# Patient Record
Sex: FEMALE | Race: WHITE | NOT HISPANIC OR LATINO
[De-identification: names, ages, dates, MRNs, and addresses within clinical notes are randomized per-mention and may not be internally consistent; named-entity substitution may affect disease eponyms.]

---

## 2017-01-25 ENCOUNTER — APPOINTMENT (OUTPATIENT)
Dept: ORTHOPEDIC SURGERY | Facility: CLINIC | Age: 72
End: 2017-01-25

## 2017-01-25 VITALS
WEIGHT: 220 LBS | TEMPERATURE: 97.8 F | SYSTOLIC BLOOD PRESSURE: 166 MMHG | HEIGHT: 61 IN | DIASTOLIC BLOOD PRESSURE: 90 MMHG | HEART RATE: 93 BPM | BODY MASS INDEX: 41.54 KG/M2

## 2017-01-25 DIAGNOSIS — Z78.9 OTHER SPECIFIED HEALTH STATUS: ICD-10-CM

## 2017-01-25 DIAGNOSIS — Z87.891 PERSONAL HISTORY OF NICOTINE DEPENDENCE: ICD-10-CM

## 2017-01-25 RX ORDER — ENALAPRIL MALEATE 5 MG/1
TABLET ORAL
Refills: 0 | Status: ACTIVE | COMMUNITY

## 2017-01-25 RX ORDER — LEVOTHYROXINE SODIUM 0.17 MG/1
TABLET ORAL
Refills: 0 | Status: ACTIVE | COMMUNITY

## 2017-01-25 RX ORDER — CELECOXIB 50 MG/1
CAPSULE ORAL
Refills: 0 | Status: ACTIVE | COMMUNITY

## 2017-01-27 ENCOUNTER — TRANSCRIPTION ENCOUNTER (OUTPATIENT)
Age: 72
End: 2017-01-27

## 2017-02-13 ENCOUNTER — APPOINTMENT (OUTPATIENT)
Dept: DERMATOLOGY | Facility: CLINIC | Age: 72
End: 2017-02-13

## 2017-03-06 ENCOUNTER — OUTPATIENT (OUTPATIENT)
Dept: OUTPATIENT SERVICES | Facility: HOSPITAL | Age: 72
LOS: 1 days | End: 2017-03-06
Payer: MEDICARE

## 2017-03-06 DIAGNOSIS — M17.12 UNILATERAL PRIMARY OSTEOARTHRITIS, LEFT KNEE: ICD-10-CM

## 2017-03-06 DIAGNOSIS — Z51.89 ENCOUNTER FOR OTHER SPECIFIED AFTERCARE: ICD-10-CM

## 2017-03-31 PROCEDURE — 97110 THERAPEUTIC EXERCISES: CPT

## 2017-03-31 PROCEDURE — 97162 PT EVAL MOD COMPLEX 30 MIN: CPT

## 2017-03-31 PROCEDURE — G8978: CPT | Mod: CK

## 2017-03-31 PROCEDURE — 97010 HOT OR COLD PACKS THERAPY: CPT

## 2017-03-31 PROCEDURE — G8979: CPT | Mod: CJ

## 2017-07-27 ENCOUNTER — RESULT REVIEW (OUTPATIENT)
Age: 72
End: 2017-07-27

## 2017-07-28 ENCOUNTER — APPOINTMENT (OUTPATIENT)
Dept: DERMATOLOGY | Facility: CLINIC | Age: 72
End: 2017-07-28
Payer: MEDICARE

## 2017-07-28 PROCEDURE — 17110 DESTRUCTION B9 LES UP TO 14: CPT

## 2017-07-28 PROCEDURE — 11100 BX SKIN SUBCUTANEOUS&/MUCOUS MEMBRANE 1 LESION: CPT | Mod: 59

## 2017-07-28 PROCEDURE — 99213 OFFICE O/P EST LOW 20 MIN: CPT | Mod: 25

## 2017-07-28 PROCEDURE — 17000 DESTRUCT PREMALG LESION: CPT | Mod: 59

## 2017-09-10 ENCOUNTER — RESULT REVIEW (OUTPATIENT)
Age: 72
End: 2017-09-10

## 2017-09-11 ENCOUNTER — APPOINTMENT (OUTPATIENT)
Dept: DERMATOLOGY | Facility: CLINIC | Age: 72
End: 2017-09-11
Payer: MEDICARE

## 2017-09-11 PROCEDURE — 99213 OFFICE O/P EST LOW 20 MIN: CPT | Mod: 25

## 2017-09-11 PROCEDURE — 17110 DESTRUCTION B9 LES UP TO 14: CPT

## 2017-09-19 ENCOUNTER — APPOINTMENT (OUTPATIENT)
Dept: NEUROLOGY | Facility: CLINIC | Age: 72
End: 2017-09-19
Payer: MEDICARE

## 2017-09-19 VITALS
HEIGHT: 62 IN | BODY MASS INDEX: 32.2 KG/M2 | WEIGHT: 175 LBS | SYSTOLIC BLOOD PRESSURE: 130 MMHG | DIASTOLIC BLOOD PRESSURE: 90 MMHG

## 2017-09-19 DIAGNOSIS — E03.9 HYPOTHYROIDISM, UNSPECIFIED: ICD-10-CM

## 2017-09-19 PROCEDURE — 99215 OFFICE O/P EST HI 40 MIN: CPT

## 2017-09-19 RX ORDER — CELECOXIB 200 MG/1
200 CAPSULE ORAL
Qty: 90 | Refills: 0 | Status: ACTIVE | COMMUNITY
Start: 2017-03-31

## 2017-09-19 RX ORDER — EXTENDED PHENYTOIN SODIUM 30 MG/1
30 CAPSULE ORAL
Qty: 180 | Refills: 0 | Status: ACTIVE | COMMUNITY
Start: 2017-03-20

## 2017-09-19 RX ORDER — ENALAPRIL MALEATE AND HYDROCHLOROTHIAZIDE 10; 25 MG/1; MG/1
10-25 TABLET ORAL
Qty: 90 | Refills: 0 | Status: ACTIVE | COMMUNITY
Start: 2017-03-31

## 2017-09-19 RX ORDER — MECLIZINE HYDROCHLORIDE 25 MG/1
25 TABLET ORAL
Qty: 90 | Refills: 0 | Status: ACTIVE | COMMUNITY
Start: 2017-04-25

## 2017-09-19 RX ORDER — ATORVASTATIN CALCIUM 10 MG/1
10 TABLET, FILM COATED ORAL
Qty: 90 | Refills: 0 | Status: ACTIVE | COMMUNITY
Start: 2017-04-24

## 2017-12-26 ENCOUNTER — APPOINTMENT (OUTPATIENT)
Dept: NEUROLOGY | Facility: CLINIC | Age: 72
End: 2017-12-26
Payer: MEDICARE

## 2017-12-26 VITALS
SYSTOLIC BLOOD PRESSURE: 128 MMHG | DIASTOLIC BLOOD PRESSURE: 76 MMHG | WEIGHT: 175 LBS | BODY MASS INDEX: 32.2 KG/M2 | HEIGHT: 62 IN

## 2017-12-26 PROCEDURE — 99214 OFFICE O/P EST MOD 30 MIN: CPT

## 2018-06-26 ENCOUNTER — APPOINTMENT (OUTPATIENT)
Dept: NEUROLOGY | Facility: CLINIC | Age: 73
End: 2018-06-26
Payer: MEDICARE

## 2018-06-26 VITALS
WEIGHT: 175 LBS | DIASTOLIC BLOOD PRESSURE: 78 MMHG | BODY MASS INDEX: 32.2 KG/M2 | HEIGHT: 62 IN | SYSTOLIC BLOOD PRESSURE: 145 MMHG

## 2018-06-26 PROCEDURE — 99214 OFFICE O/P EST MOD 30 MIN: CPT

## 2018-07-12 ENCOUNTER — APPOINTMENT (OUTPATIENT)
Dept: DERMATOLOGY | Facility: CLINIC | Age: 73
End: 2018-07-12
Payer: MEDICARE

## 2018-07-12 PROCEDURE — 99214 OFFICE O/P EST MOD 30 MIN: CPT

## 2018-09-19 ENCOUNTER — APPOINTMENT (OUTPATIENT)
Dept: DERMATOLOGY | Facility: CLINIC | Age: 73
End: 2018-09-19

## 2018-09-25 ENCOUNTER — RESULT REVIEW (OUTPATIENT)
Age: 73
End: 2018-09-25

## 2018-09-26 ENCOUNTER — APPOINTMENT (OUTPATIENT)
Dept: DERMATOLOGY | Facility: CLINIC | Age: 73
End: 2018-09-26
Payer: MEDICARE

## 2018-09-26 PROCEDURE — 17262 DSTRJ MAL LES T/A/L 1.1-2.0: CPT

## 2018-09-26 PROCEDURE — 99212 OFFICE O/P EST SF 10 MIN: CPT | Mod: 25

## 2018-10-03 ENCOUNTER — APPOINTMENT (OUTPATIENT)
Dept: DERMATOLOGY | Facility: CLINIC | Age: 73
End: 2018-10-03

## 2018-12-12 ENCOUNTER — APPOINTMENT (OUTPATIENT)
Dept: NEUROLOGY | Facility: CLINIC | Age: 73
End: 2018-12-12
Payer: MEDICARE

## 2018-12-12 VITALS — HEIGHT: 62 IN | WEIGHT: 175 LBS | BODY MASS INDEX: 32.2 KG/M2

## 2018-12-12 DIAGNOSIS — I10 ESSENTIAL (PRIMARY) HYPERTENSION: ICD-10-CM

## 2018-12-12 PROCEDURE — 99214 OFFICE O/P EST MOD 30 MIN: CPT

## 2019-02-12 ENCOUNTER — RESULT REVIEW (OUTPATIENT)
Age: 74
End: 2019-02-12

## 2019-02-12 ENCOUNTER — APPOINTMENT (OUTPATIENT)
Dept: DERMATOLOGY | Facility: CLINIC | Age: 74
End: 2019-02-12
Payer: MEDICARE

## 2019-02-12 PROCEDURE — 99214 OFFICE O/P EST MOD 30 MIN: CPT | Mod: 25

## 2019-02-12 PROCEDURE — 17262 DSTRJ MAL LES T/A/L 1.1-2.0: CPT

## 2019-06-05 ENCOUNTER — RX RENEWAL (OUTPATIENT)
Age: 74
End: 2019-06-05

## 2019-06-21 ENCOUNTER — TRANSCRIPTION ENCOUNTER (OUTPATIENT)
Age: 74
End: 2019-06-21

## 2019-06-23 ENCOUNTER — RESULT REVIEW (OUTPATIENT)
Age: 74
End: 2019-06-23

## 2019-06-24 ENCOUNTER — APPOINTMENT (OUTPATIENT)
Dept: DERMATOLOGY | Facility: CLINIC | Age: 74
End: 2019-06-24
Payer: MEDICARE

## 2019-06-24 PROCEDURE — 17261 DSTRJ MAL LES T/A/L .6-1.0CM: CPT

## 2019-06-24 PROCEDURE — 11102 TANGNTL BX SKIN SINGLE LES: CPT | Mod: 59

## 2019-06-24 PROCEDURE — 99214 OFFICE O/P EST MOD 30 MIN: CPT | Mod: 25

## 2019-08-04 ENCOUNTER — RESULT REVIEW (OUTPATIENT)
Age: 74
End: 2019-08-04

## 2019-08-05 ENCOUNTER — APPOINTMENT (OUTPATIENT)
Dept: DERMATOLOGY | Facility: CLINIC | Age: 74
End: 2019-08-05
Payer: MEDICARE

## 2019-08-05 PROCEDURE — 17281 DSTR MAL LS F/E/E/N/L/M .6-1: CPT

## 2019-08-05 PROCEDURE — 99213 OFFICE O/P EST LOW 20 MIN: CPT | Mod: 25

## 2019-11-12 ENCOUNTER — MEDICATION RENEWAL (OUTPATIENT)
Age: 74
End: 2019-11-12

## 2019-12-03 ENCOUNTER — RX RENEWAL (OUTPATIENT)
Age: 74
End: 2019-12-03

## 2019-12-16 ENCOUNTER — RESULT REVIEW (OUTPATIENT)
Age: 74
End: 2019-12-16

## 2019-12-16 ENCOUNTER — APPOINTMENT (OUTPATIENT)
Dept: DERMATOLOGY | Facility: CLINIC | Age: 74
End: 2019-12-16
Payer: MEDICARE

## 2019-12-16 PROCEDURE — 99213 OFFICE O/P EST LOW 20 MIN: CPT | Mod: 25

## 2019-12-16 PROCEDURE — 11102 TANGNTL BX SKIN SINGLE LES: CPT

## 2019-12-22 ENCOUNTER — TRANSCRIPTION ENCOUNTER (OUTPATIENT)
Age: 74
End: 2019-12-22

## 2020-01-02 ENCOUNTER — APPOINTMENT (OUTPATIENT)
Dept: SURGICAL ONCOLOGY | Facility: CLINIC | Age: 75
End: 2020-01-02
Payer: MEDICARE

## 2020-01-02 VITALS
HEART RATE: 84 BPM | SYSTOLIC BLOOD PRESSURE: 155 MMHG | WEIGHT: 185 LBS | HEIGHT: 62 IN | TEMPERATURE: 97.7 F | BODY MASS INDEX: 34.04 KG/M2 | DIASTOLIC BLOOD PRESSURE: 83 MMHG | OXYGEN SATURATION: 100 %

## 2020-01-02 DIAGNOSIS — C44.42 SQUAMOUS CELL CARCINOMA OF SKIN OF SCALP AND NECK: ICD-10-CM

## 2020-01-02 DIAGNOSIS — Z80.3 FAMILY HISTORY OF MALIGNANT NEOPLASM OF BREAST: ICD-10-CM

## 2020-01-02 DIAGNOSIS — C44.90 UNSPECIFIED MALIGNANT NEOPLASM OF SKIN, UNSPECIFIED: ICD-10-CM

## 2020-01-02 PROCEDURE — 99204 OFFICE O/P NEW MOD 45 MIN: CPT

## 2020-01-02 NOTE — ASSESSMENT
[FreeTextEntry1] : IMP:\par Left superior parietal scalp shave biopsy performed on 12/16/19 -  Squamous cell carcinoma, invasive , well to moderately differentiated, present at the deep and peripheral tissue edges of the specimen, associated SCC in situ, inflamed actinic keratosis. \par \par PLAN:\par Wide local excision of left parietal scalp SCC. We discussed the risks, benefits, and alternatives of the procedure with the patient, she expressed understanding and agree to proceed.\par Patient does NOT want any sedation or general anesthesia.  Will do it in OR as it is a more controlled setting and this reconstruction may require a skin graft.

## 2020-01-02 NOTE — HISTORY OF PRESENT ILLNESS
[de-identified] : Ms. PARMJIT GO  is a 74 year  old female  presenting for an initial consultation for newly diagnosed skin cancer, referred by Dr. Alejo. \par \par Left superior parietal scalp shave biopsy performed on 12/16/19 -  Squamous cell carcinoma, invasive , well to moderately differentiated, present at the deep and peripheral tissue edges of the specimen, associated SCC in situ, inflamed actinic keratosis. \par \par Ms. GO reports a longstanding history of sun exposure without the use of sunblock. History of sunburns.  Denies any other concerning lesions or masses. Denies bleeding or pruritic moles. Denies pain or constitutional symptoms.\par \par PMH: Epilepsy (on Dilantin??), HTN, HLD, Hypothyroid, skin cancer (SCC in situ). \par \par
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
## 2020-01-02 NOTE — CONSULT LETTER
[Consult Letter:] : I had the pleasure of evaluating your patient, [unfilled]. [Dear  ___] : Dear  [unfilled], [Please see my note below.] : Please see my note below. [Consult Closing:] : Thank you very much for allowing me to participate in the care of this patient.  If you have any questions, please do not hesitate to contact me. [Sincerely,] : Sincerely, [FreeTextEntry3] : Marty Rowland MD, MPH, FACS\par Surgical Oncology\par [facility] [place]\par Associate Professor of Surgery\par Department of General Surgery\par [employer] [facility]

## 2020-01-14 ENCOUNTER — APPOINTMENT (OUTPATIENT)
Dept: DERMATOLOGY | Facility: CLINIC | Age: 75
End: 2020-01-14
Payer: MEDICARE

## 2020-01-14 PROCEDURE — 99213 OFFICE O/P EST LOW 20 MIN: CPT

## 2020-03-27 ENCOUNTER — APPOINTMENT (OUTPATIENT)
Dept: NEUROLOGY | Facility: CLINIC | Age: 75
End: 2020-03-27

## 2020-07-21 ENCOUNTER — APPOINTMENT (OUTPATIENT)
Dept: NEUROLOGY | Facility: CLINIC | Age: 75
End: 2020-07-21
Payer: MEDICARE

## 2020-07-21 PROCEDURE — 99213 OFFICE O/P EST LOW 20 MIN: CPT | Mod: 95

## 2020-07-21 NOTE — REASON FOR VISIT
[Follow-Up: _____] : a [unfilled] follow-up visit [FreeTextEntry1] : Chief complaint: Seizure disorder, Dr. Sewell

## 2020-07-21 NOTE — HISTORY OF PRESENT ILLNESS
[FreeTextEntry1] : I had seen her initially on May 3, 2017 with balance difficulties. She has a long history of epilepsy and was on Dilantin 630 mg a day at that time. She was following with a specialist at [facility] and said she needed to stay on the medicine even know her last seizure was 15 years ago. I sent her for a stat Dilantin level and it was 39. We  reduced the Dilantin 500 mg a day. Level June 28 was 21.4. She did not want to reduce the medicine any further. Level done September 15 was 22.2. \par \par She was still having balance difficulties so eventually we reduced the Dilantin to 400 mg a day. Level a few weeks later was 12.6. She has felt great on Dilantin 400 mg a day. She says her balance is fine. She is functioning much better overall. There have been no further seizures.\par \par Also on medication for blood pressure which is under good control.

## 2020-07-21 NOTE — CONSULT LETTER
[Dear  ___] : Dear  [unfilled], [Consult Letter:] : I had the pleasure of evaluating your patient, [unfilled]. [Please see my note below.] : Please see my note below. [Consult Closing:] : Thank you very much for allowing me to participate in the care of this patient.  If you have any questions, please do not hesitate to contact me. [Sincerely,] : Sincerely, [FreeTextEntry3] : Huey Piedra MD, PhD\par

## 2020-07-21 NOTE — ASSESSMENT
[FreeTextEntry1] : Seizure disorder. Doing well on Dilantin 400 mg a day which will be renewed. Followup in 6 months.

## 2020-09-21 ENCOUNTER — RESULT REVIEW (OUTPATIENT)
Age: 75
End: 2020-09-21

## 2020-09-22 ENCOUNTER — APPOINTMENT (OUTPATIENT)
Dept: DERMATOLOGY | Facility: CLINIC | Age: 75
End: 2020-09-22
Payer: MEDICARE

## 2020-09-22 PROCEDURE — 17272 DSTR MAL LES S/N/H/F/G 1.1-2: CPT

## 2020-09-22 PROCEDURE — 99214 OFFICE O/P EST MOD 30 MIN: CPT | Mod: 25

## 2020-09-22 PROCEDURE — 17000 DESTRUCT PREMALG LESION: CPT | Mod: 59

## 2020-11-23 ENCOUNTER — APPOINTMENT (OUTPATIENT)
Dept: DERMATOLOGY | Facility: CLINIC | Age: 75
End: 2020-11-23
Payer: MEDICARE

## 2020-11-23 ENCOUNTER — RESULT REVIEW (OUTPATIENT)
Age: 75
End: 2020-11-23

## 2020-11-23 PROCEDURE — 99213 OFFICE O/P EST LOW 20 MIN: CPT | Mod: 25

## 2020-11-23 PROCEDURE — 17282 DSTR MAL LS F/E/E/N/L/M1.1-2: CPT

## 2020-11-23 PROCEDURE — 69100 BIOPSY OF EXTERNAL EAR: CPT | Mod: 59

## 2020-12-10 ENCOUNTER — APPOINTMENT (OUTPATIENT)
Dept: DERMATOLOGY | Facility: CLINIC | Age: 75
End: 2020-12-10

## 2020-12-12 ENCOUNTER — TRANSCRIPTION ENCOUNTER (OUTPATIENT)
Age: 75
End: 2020-12-12

## 2020-12-19 ENCOUNTER — TRANSCRIPTION ENCOUNTER (OUTPATIENT)
Age: 75
End: 2020-12-19

## 2021-01-21 ENCOUNTER — APPOINTMENT (OUTPATIENT)
Dept: NEUROLOGY | Facility: CLINIC | Age: 76
End: 2021-01-21

## 2021-02-09 ENCOUNTER — TRANSCRIPTION ENCOUNTER (OUTPATIENT)
Age: 76
End: 2021-02-09

## 2021-03-05 ENCOUNTER — RESULT REVIEW (OUTPATIENT)
Age: 76
End: 2021-03-05

## 2021-03-05 ENCOUNTER — APPOINTMENT (OUTPATIENT)
Dept: DERMATOLOGY | Facility: CLINIC | Age: 76
End: 2021-03-05
Payer: MEDICARE

## 2021-03-05 PROCEDURE — 17261 DSTRJ MAL LES T/A/L .6-1.0CM: CPT

## 2021-03-05 PROCEDURE — 17262 DSTRJ MAL LES T/A/L 1.1-2.0: CPT

## 2021-03-05 PROCEDURE — 17000 DESTRUCT PREMALG LESION: CPT | Mod: 59

## 2021-03-05 PROCEDURE — 99213 OFFICE O/P EST LOW 20 MIN: CPT | Mod: 25

## 2021-05-07 ENCOUNTER — APPOINTMENT (OUTPATIENT)
Dept: DERMATOLOGY | Facility: CLINIC | Age: 76
End: 2021-05-07
Payer: MEDICARE

## 2021-05-07 ENCOUNTER — RESULT REVIEW (OUTPATIENT)
Age: 76
End: 2021-05-07

## 2021-05-07 PROCEDURE — 99213 OFFICE O/P EST LOW 20 MIN: CPT | Mod: 25

## 2021-05-07 PROCEDURE — 17003 DESTRUCT PREMALG LES 2-14: CPT

## 2021-05-07 PROCEDURE — 17000 DESTRUCT PREMALG LESION: CPT | Mod: 59

## 2021-05-07 PROCEDURE — 17271 DSTR MAL LES S/N/H/F/G 0.6-1: CPT

## 2021-05-25 ENCOUNTER — APPOINTMENT (OUTPATIENT)
Dept: DERMATOLOGY | Facility: CLINIC | Age: 76
End: 2021-05-25
Payer: MEDICARE

## 2021-05-25 PROCEDURE — 10060 I&D ABSCESS SIMPLE/SINGLE: CPT

## 2021-05-25 PROCEDURE — 99212 OFFICE O/P EST SF 10 MIN: CPT | Mod: 25

## 2021-07-13 ENCOUNTER — APPOINTMENT (OUTPATIENT)
Dept: DERMATOLOGY | Facility: CLINIC | Age: 76
End: 2021-07-13
Payer: MEDICARE

## 2021-07-13 ENCOUNTER — RESULT REVIEW (OUTPATIENT)
Age: 76
End: 2021-07-13

## 2021-07-13 PROCEDURE — 11102 TANGNTL BX SKIN SINGLE LES: CPT | Mod: 59

## 2021-07-13 PROCEDURE — 99212 OFFICE O/P EST SF 10 MIN: CPT | Mod: 25

## 2021-07-13 PROCEDURE — 11103 TANGNTL BX SKIN EA SEP/ADDL: CPT

## 2021-07-13 PROCEDURE — 17261 DSTRJ MAL LES T/A/L .6-1.0CM: CPT

## 2021-08-30 ENCOUNTER — APPOINTMENT (OUTPATIENT)
Dept: DERMATOLOGY | Facility: CLINIC | Age: 76
End: 2021-08-30
Payer: MEDICARE

## 2021-08-30 PROCEDURE — 17000 DESTRUCT PREMALG LESION: CPT

## 2021-08-30 PROCEDURE — 17003 DESTRUCT PREMALG LES 2-14: CPT

## 2021-08-30 PROCEDURE — 99213 OFFICE O/P EST LOW 20 MIN: CPT | Mod: 25

## 2021-09-15 ENCOUNTER — APPOINTMENT (OUTPATIENT)
Dept: NEUROLOGY | Facility: CLINIC | Age: 76
End: 2021-09-15
Payer: MEDICARE

## 2021-09-15 VITALS
DIASTOLIC BLOOD PRESSURE: 80 MMHG | HEIGHT: 62 IN | BODY MASS INDEX: 33.13 KG/M2 | SYSTOLIC BLOOD PRESSURE: 140 MMHG | WEIGHT: 180 LBS

## 2021-09-15 PROCEDURE — 99214 OFFICE O/P EST MOD 30 MIN: CPT

## 2021-10-31 ENCOUNTER — RX RENEWAL (OUTPATIENT)
Age: 76
End: 2021-10-31

## 2021-11-22 ENCOUNTER — APPOINTMENT (OUTPATIENT)
Dept: DERMATOLOGY | Facility: CLINIC | Age: 76
End: 2021-11-22
Payer: MEDICARE

## 2021-11-22 PROCEDURE — 99213 OFFICE O/P EST LOW 20 MIN: CPT

## 2022-03-22 ENCOUNTER — APPOINTMENT (OUTPATIENT)
Dept: DERMATOLOGY | Facility: CLINIC | Age: 77
End: 2022-03-22
Payer: MEDICARE

## 2022-03-22 ENCOUNTER — RESULT REVIEW (OUTPATIENT)
Age: 77
End: 2022-03-22

## 2022-03-22 PROCEDURE — 11102 TANGNTL BX SKIN SINGLE LES: CPT

## 2022-03-22 PROCEDURE — 99213 OFFICE O/P EST LOW 20 MIN: CPT | Mod: 25

## 2022-05-23 ENCOUNTER — APPOINTMENT (OUTPATIENT)
Dept: DERMATOLOGY | Facility: CLINIC | Age: 77
End: 2022-05-23
Payer: MEDICARE

## 2022-05-23 ENCOUNTER — RESULT REVIEW (OUTPATIENT)
Age: 77
End: 2022-05-23

## 2022-05-23 PROCEDURE — 17000 DESTRUCT PREMALG LESION: CPT

## 2022-05-23 PROCEDURE — 99213 OFFICE O/P EST LOW 20 MIN: CPT | Mod: 25

## 2022-06-05 ENCOUNTER — EMERGENCY (EMERGENCY)
Facility: HOSPITAL | Age: 77
LOS: 1 days | Discharge: HOME | End: 2022-06-05
Attending: EMERGENCY MEDICINE
Payer: MEDICARE

## 2022-06-05 VITALS
SYSTOLIC BLOOD PRESSURE: 132 MMHG | HEART RATE: 86 BPM | OXYGEN SATURATION: 95 % | RESPIRATION RATE: 18 BRPM | DIASTOLIC BLOOD PRESSURE: 82 MMHG | TEMPERATURE: 98 F

## 2022-06-05 VITALS
HEART RATE: 79 BPM | SYSTOLIC BLOOD PRESSURE: 118 MMHG | DIASTOLIC BLOOD PRESSURE: 71 MMHG | TEMPERATURE: 98 F | OXYGEN SATURATION: 96 % | RESPIRATION RATE: 18 BRPM

## 2022-06-05 LAB
ANION GAP SERPL CALC-SCNC: 13 MMOL/L — SIGNIFICANT CHANGE UP (ref 5–17)
BASOPHILS # BLD AUTO: 0.05 K/UL — SIGNIFICANT CHANGE UP (ref 0–0.2)
BASOPHILS NFR BLD AUTO: 0.6 % — SIGNIFICANT CHANGE UP (ref 0–2)
BUN SERPL-MCNC: 30.6 MG/DL — HIGH (ref 8–20)
CALCIUM SERPL-MCNC: 9.3 MG/DL — SIGNIFICANT CHANGE UP (ref 8.6–10.2)
CHLORIDE SERPL-SCNC: 100 MMOL/L — SIGNIFICANT CHANGE UP (ref 98–107)
CO2 SERPL-SCNC: 24 MMOL/L — SIGNIFICANT CHANGE UP (ref 22–29)
CREAT SERPL-MCNC: 1.12 MG/DL — SIGNIFICANT CHANGE UP (ref 0.5–1.3)
EGFR: 51 ML/MIN/1.73M2 — LOW
EOSINOPHIL # BLD AUTO: 0.11 K/UL — SIGNIFICANT CHANGE UP (ref 0–0.5)
EOSINOPHIL NFR BLD AUTO: 1.4 % — SIGNIFICANT CHANGE UP (ref 0–6)
GLUCOSE SERPL-MCNC: 108 MG/DL — HIGH (ref 70–99)
HCT VFR BLD CALC: 35.9 % — SIGNIFICANT CHANGE UP (ref 34.5–45)
HGB BLD-MCNC: 11.7 G/DL — SIGNIFICANT CHANGE UP (ref 11.5–15.5)
IMM GRANULOCYTES NFR BLD AUTO: 0.4 % — SIGNIFICANT CHANGE UP (ref 0–1.5)
LYMPHOCYTES # BLD AUTO: 1.22 K/UL — SIGNIFICANT CHANGE UP (ref 1–3.3)
LYMPHOCYTES # BLD AUTO: 15 % — SIGNIFICANT CHANGE UP (ref 13–44)
MCHC RBC-ENTMCNC: 29.4 PG — SIGNIFICANT CHANGE UP (ref 27–34)
MCHC RBC-ENTMCNC: 32.6 GM/DL — SIGNIFICANT CHANGE UP (ref 32–36)
MCV RBC AUTO: 90.2 FL — SIGNIFICANT CHANGE UP (ref 80–100)
MONOCYTES # BLD AUTO: 0.42 K/UL — SIGNIFICANT CHANGE UP (ref 0–0.9)
MONOCYTES NFR BLD AUTO: 5.2 % — SIGNIFICANT CHANGE UP (ref 2–14)
NEUTROPHILS # BLD AUTO: 6.29 K/UL — SIGNIFICANT CHANGE UP (ref 1.8–7.4)
NEUTROPHILS NFR BLD AUTO: 77.4 % — HIGH (ref 43–77)
PLATELET # BLD AUTO: 185 K/UL — SIGNIFICANT CHANGE UP (ref 150–400)
POTASSIUM SERPL-MCNC: 3.8 MMOL/L — SIGNIFICANT CHANGE UP (ref 3.5–5.3)
POTASSIUM SERPL-SCNC: 3.8 MMOL/L — SIGNIFICANT CHANGE UP (ref 3.5–5.3)
RBC # BLD: 3.98 M/UL — SIGNIFICANT CHANGE UP (ref 3.8–5.2)
RBC # FLD: 13.3 % — SIGNIFICANT CHANGE UP (ref 10.3–14.5)
SODIUM SERPL-SCNC: 137 MMOL/L — SIGNIFICANT CHANGE UP (ref 135–145)
TROPONIN T SERPL-MCNC: <0.01 NG/ML — SIGNIFICANT CHANGE UP (ref 0–0.06)
WBC # BLD: 8.12 K/UL — SIGNIFICANT CHANGE UP (ref 3.8–10.5)
WBC # FLD AUTO: 8.12 K/UL — SIGNIFICANT CHANGE UP (ref 3.8–10.5)

## 2022-06-05 PROCEDURE — 73590 X-RAY EXAM OF LOWER LEG: CPT | Mod: 26,LT

## 2022-06-05 PROCEDURE — 84484 ASSAY OF TROPONIN QUANT: CPT

## 2022-06-05 PROCEDURE — 93005 ELECTROCARDIOGRAM TRACING: CPT

## 2022-06-05 PROCEDURE — 80048 BASIC METABOLIC PNL TOTAL CA: CPT

## 2022-06-05 PROCEDURE — 99284 EMERGENCY DEPT VISIT MOD MDM: CPT | Mod: 25

## 2022-06-05 PROCEDURE — 73610 X-RAY EXAM OF ANKLE: CPT

## 2022-06-05 PROCEDURE — 73610 X-RAY EXAM OF ANKLE: CPT | Mod: 26,LT

## 2022-06-05 PROCEDURE — 12002 RPR S/N/AX/GEN/TRNK2.6-7.5CM: CPT

## 2022-06-05 PROCEDURE — 73590 X-RAY EXAM OF LOWER LEG: CPT

## 2022-06-05 PROCEDURE — 99285 EMERGENCY DEPT VISIT HI MDM: CPT | Mod: 25

## 2022-06-05 PROCEDURE — 93010 ELECTROCARDIOGRAM REPORT: CPT

## 2022-06-05 PROCEDURE — 90471 IMMUNIZATION ADMIN: CPT

## 2022-06-05 PROCEDURE — 36415 COLL VENOUS BLD VENIPUNCTURE: CPT

## 2022-06-05 PROCEDURE — 12002 RPR S/N/AX/GEN/TRNK2.6-7.5CM: CPT | Mod: XU

## 2022-06-05 PROCEDURE — 85025 COMPLETE CBC W/AUTO DIFF WBC: CPT

## 2022-06-05 PROCEDURE — 90715 TDAP VACCINE 7 YRS/> IM: CPT

## 2022-06-05 RX ORDER — LIDOCAINE HYDROCHLORIDE AND EPINEPHRINE 10; 10 MG/ML; UG/ML
20 INJECTION, SOLUTION INFILTRATION; PERINEURAL ONCE
Refills: 0 | Status: DISCONTINUED | OUTPATIENT
Start: 2022-06-05 | End: 2022-06-10

## 2022-06-05 RX ORDER — TETANUS TOXOID, REDUCED DIPHTHERIA TOXOID AND ACELLULAR PERTUSSIS VACCINE, ADSORBED 5; 2.5; 8; 8; 2.5 [IU]/.5ML; [IU]/.5ML; UG/.5ML; UG/.5ML; UG/.5ML
0.5 SUSPENSION INTRAMUSCULAR ONCE
Refills: 0 | Status: COMPLETED | OUTPATIENT
Start: 2022-06-05 | End: 2022-06-05

## 2022-06-05 RX ADMIN — TETANUS TOXOID, REDUCED DIPHTHERIA TOXOID AND ACELLULAR PERTUSSIS VACCINE, ADSORBED 0.5 MILLILITER(S): 5; 2.5; 8; 8; 2.5 SUSPENSION INTRAMUSCULAR at 17:58

## 2022-06-05 NOTE — ED PROVIDER NOTE - OBJECTIVE STATEMENT
75 yo female hx of htn and seizure d/o (no seizure in >20yrs); p/w left leg and ankle injury; patient was walking from one room of house to another, normally is fully independent without cane/walker; step down into den/living, lost her foot and tripped, twisted left ankle; had and assoc laceration to left leg; was able to put some weight on it afterwards, but had a near syncopal episode upon being stood up by ; subsequently rested in chair and called 911; those symptoms have fully resoleved, and now only c/o left leg pain

## 2022-06-05 NOTE — ED PROVIDER NOTE - PHYSICAL EXAMINATION
Const: Awake, alert and oriented. In no acute distress. Well appearing.  HEENT: NC/AT. Moist mucous membranes.  Eyes: No scleral icterus. EOMI.  Neck:. Soft and supple. Full ROM without pain.  Cardiac: Regular rate and rhythm. +S1/S2. No murmurs. Peripheral pulses 2+ and symmetric. No LE edema.  Resp: Speaking in full sentences. No evidence of respiratory distress. No wheezes, rales or rhonchi.  Abd: Soft, non-tender, non-distended. Normal bowel sounds in all 4 quadrants. No guarding or rebound.  Back: Spine midline and non-tender. No CVAT.  Neuro: no gross deficits, moving all extremities, normal speech  Skin: left medial tib/fib with 5cm semicircular lac with some subq fat exposure  MSK: left ankle mild swelling, no deformity

## 2022-06-05 NOTE — ED ADULT NURSE NOTE - OBJECTIVE STATEMENT
pt reports left left leg pain / laceratrion sp missed a step. also reports right ankle pain/ swelling. aao x4/ denies head trauma or loc. hx of epilepsy without incident "for a lot of years"

## 2022-06-05 NOTE — ED PROVIDER NOTE - CARE PLAN
Detail Level: Zone 1 Principal Discharge DX:	Ankle sprain  Secondary Diagnosis:	Laceration of left lower leg

## 2022-06-05 NOTE — ED PROCEDURE NOTE - CPROC ED POST PROC CARE GUIDE1
Verbal/written post procedure instructions were given to patient/caregiver./Instructed patient/caregiver regarding signs and symptoms of infection./Elevate the injured extremity as instructed./Keep the cast/splint/dressing clean and dry.
Verbal/written post procedure instructions were given to patient/caregiver./Instructed patient/caregiver to follow-up with primary care physician./Instructed patient/caregiver regarding signs and symptoms of infection./Keep the cast/splint/dressing clean and dry.

## 2022-06-05 NOTE — ED PROVIDER NOTE - NSFOLLOWUPINSTRUCTIONS_ED_ALL_ED_FT
SUTURE REMOVAL IN TWO WEEKS  RETURN SOONER IF ANY SIGNS OF INFECTION    Laceration    A laceration is a cut that goes through all of the layers of the skin and into the tissue that is right under the skin. Some lacerations heal on their own. Others need to be closed with skin adhesive strips, skin glue, stitches (sutures), or staples. Proper laceration care minimizes the risk of infection and helps the laceration to heal better.  If non-absorbable stitches or staples have been placed, they must be taken out within the time frame instructed by your healthcare provider.    SEEK IMMEDIATE MEDICAL CARE IF YOU HAVE ANY OF THE FOLLOWING SYMPTOMS: swelling around the wound, worsening pain, drainage from the wound, red streaking going away from your wound, inability to move finger or toe near the laceration, or discoloration of skin near the laceration.    Sprain    A sprain is a stretch or tear in one of the tough, fiber-like tissues (ligaments) in your body. This is caused by an injury to the area such as a twisting mechanism. Symptoms include pain, swelling, or bruising. Rest that area over the next several days and slowly resume activity when tolerated. Ice can help with swelling and pain.     SEEK IMMEDIATE MEDICAL CARE IF YOU HAVE ANY OF THE FOLLOWING SYMPTOMS: worsening pain, inability to move that body part, numbness or tingling.

## 2022-06-05 NOTE — ED ADULT NURSE NOTE - NSIMPLEMENTINTERV_GEN_ALL_ED
Implemented All Fall Risk Interventions:  [place] to call system. Call bell, personal items and telephone within reach. Instruct patient to call for assistance. Room bathroom lighting operational. Non-slip footwear when patient is off stretcher. Physically safe environment: no spills, clutter or unnecessary equipment. Stretcher in lowest position, wheels locked, appropriate side rails in place. Provide visual cue, wrist band, yellow gown, etc. Monitor gait and stability. Monitor for mental status changes and reorient to person, place, and time. Review medications for side effects contributing to fall risk. Reinforce activity limits and safety measures with patient and family.

## 2022-06-05 NOTE — ED PROCEDURE NOTE - PROCEDURE ADDITIONAL DETAILS
small area of corner of semicircular wound with devitalized tissue, unable to be fully reapproximated; will allow to heal by secondary intention

## 2022-06-05 NOTE — ED PROVIDER NOTE - PATIENT PORTAL LINK FT
You can access the FollowMyHealth Patient Portal offered by [facility] by registering at the following website: [email or web address]. By joining [facility]’s FollowMyHealth portal, you will also be able to view your health information using other applications (apps) compatible with our system.

## 2022-06-05 NOTE — ED ADULT TRIAGE NOTE - PRO INTERPRETER NEED 2
Over the last 2 weeks, how often have you been bothered by the following problems?          PHQ2 Score: 0  PHQ2 Score Interpretation: No further screening needed  1. Little interest or pleasure in activity?: 0  2. Feeling down, depressed, or hopeless?: 0           CC:  Eleonora Wadsworth is here today for Derm Problem (itchy and painful dimple on spine)   Patient states it is really itchy has tried hydrocortisone, and ice helps but its not practical. Has been about a week. It is near her tailbone.    Medications: medications verified and updated  Refills needed today?  NO  denies Latex allergy or sensitivity  Patient would like communication of their results via:      Cell Phone:   Telephone Information:   Mobile [phone]     Okay to leave a message containing results? Yes  Tobacco history: verified  Advanced directives: Yes on file  Patient's current [facility] status: Active.  There are no preventive care reminders to display for this patient.    Patient is up to date, no discussion needed..   English

## 2022-06-05 NOTE — ED PROVIDER NOTE - CLINICAL SUMMARY MEDICAL DECISION MAKING FREE TEXT BOX
no apparent fracture; patient splinted, +able to weight bear and ambulate; stirrup velcro splint applied for support; laceration repaired; advised on wound care and return for suture removal; all labs and ECG reviewed, no emergent findings; ok for d/c,  at bedside for escort home

## 2022-06-05 NOTE — ED ADULT TRIAGE NOTE - CHIEF COMPLAINT QUOTE
C/o ankle pain s/p fall. Pt states, "I missed the step and fell but when I got up I felt lightheaded". Denies LOC or use of anticoagulant. Abrasion to left ankle. Hx of epilepsy.

## 2022-06-20 ENCOUNTER — NON-APPOINTMENT (OUTPATIENT)
Age: 77
End: 2022-06-20

## 2022-06-30 ENCOUNTER — NON-APPOINTMENT (OUTPATIENT)
Age: 77
End: 2022-06-30

## 2022-07-27 ENCOUNTER — NON-APPOINTMENT (OUTPATIENT)
Age: 77
End: 2022-07-27

## 2022-08-29 ENCOUNTER — RESULT REVIEW (OUTPATIENT)
Age: 77
End: 2022-08-29

## 2022-08-29 ENCOUNTER — APPOINTMENT (OUTPATIENT)
Dept: DERMATOLOGY | Facility: CLINIC | Age: 77
End: 2022-08-29

## 2022-08-29 PROBLEM — I10 ESSENTIAL (PRIMARY) HYPERTENSION: Chronic | Status: ACTIVE | Noted: 2022-06-05

## 2022-08-29 PROBLEM — R56.9 UNSPECIFIED CONVULSIONS: Chronic | Status: ACTIVE | Noted: 2022-06-05

## 2022-08-29 PROCEDURE — 17281 DSTR MAL LS F/E/E/N/L/M .6-1: CPT

## 2022-08-29 PROCEDURE — 17262 DSTRJ MAL LES T/A/L 1.1-2.0: CPT | Mod: 59

## 2022-08-29 PROCEDURE — 99213 OFFICE O/P EST LOW 20 MIN: CPT | Mod: 25

## 2022-10-26 ENCOUNTER — RX RENEWAL (OUTPATIENT)
Age: 77
End: 2022-10-26

## 2022-11-07 ENCOUNTER — RESULT REVIEW (OUTPATIENT)
Age: 77
End: 2022-11-07

## 2022-11-08 ENCOUNTER — APPOINTMENT (OUTPATIENT)
Dept: DERMATOLOGY | Facility: CLINIC | Age: 77
End: 2022-11-08

## 2022-11-08 PROCEDURE — 99213 OFFICE O/P EST LOW 20 MIN: CPT | Mod: 25

## 2022-11-08 PROCEDURE — 17000 DESTRUCT PREMALG LESION: CPT | Mod: 59

## 2022-11-08 PROCEDURE — 11102 TANGNTL BX SKIN SINGLE LES: CPT | Mod: 59

## 2022-11-08 PROCEDURE — 17272 DSTR MAL LES S/N/H/F/G 1.1-2: CPT

## 2022-11-09 ENCOUNTER — APPOINTMENT (OUTPATIENT)
Dept: NEUROLOGY | Facility: CLINIC | Age: 77
End: 2022-11-09

## 2022-11-09 ENCOUNTER — NON-APPOINTMENT (OUTPATIENT)
Age: 77
End: 2022-11-09

## 2022-11-09 VITALS
DIASTOLIC BLOOD PRESSURE: 90 MMHG | HEIGHT: 61 IN | SYSTOLIC BLOOD PRESSURE: 160 MMHG | WEIGHT: 195 LBS | BODY MASS INDEX: 36.82 KG/M2

## 2022-11-09 PROCEDURE — 99214 OFFICE O/P EST MOD 30 MIN: CPT

## 2022-11-09 NOTE — HISTORY OF PRESENT ILLNESS
[FreeTextEntry1] : I had seen her initially on May 3, 2017 with balance difficulties. She has a long history of epilepsy and was on Dilantin 630 mg a day at that time. She was following with a specialist at [facility] and said she needed to stay on the medicine even know her last seizure was 15 years ago. I sent her for a stat Dilantin level and it was 39. We  reduced the Dilantin 500 mg a day. Level June 28 was 21.4. She did not want to reduce the medicine any further. Level done September 15 was 22.2. \par \par She was still having balance difficulties so eventually we reduced the Dilantin to 400 mg a day. Level a few weeks later was 12.6. She has felt great on Dilantin 400 mg a day. She says her balance is fine. She is functioning much better overall. There have been no further seizures.\par \par Also on medication for blood pressure which is under good control.\par \par Returns today, 11/9/2022.\par She feels great.  There have been no seizures.\par Says her most recent Dilantin level was 8.4

## 2022-11-09 NOTE — PHYSICAL EXAM
[FreeTextEntry1] : \par Mental status: Alert, fully oriented, speech comprehension intact, recent memory intact\par \par Cranial nerves: No ptosis  Extraocular movements full. Facial strength and sensation are normal. Tongue midline.\par \par Motor: Strength grossly normal throughout. There is no drift. No abnormal movements observed.\par \par Sensation: Intact to touch throughout\par \par Coordination: Finger-nose intact\par \par Reflexes symmetrical\par \par Gait  normal.\par \par Romberg absent.\par

## 2022-11-09 NOTE — ASSESSMENT
[FreeTextEntry1] : Seizure disorder. Doing well on Dilantin 400 mg a day which will be renewed. Followup in 1 year.

## 2022-12-09 NOTE — ED PROVIDER NOTE - INTERNATIONAL TRAVEL
Form filled out and faxed back to [facility] services department.   No

## 2022-12-13 ENCOUNTER — APPOINTMENT (OUTPATIENT)
Dept: DERMATOLOGY | Facility: CLINIC | Age: 77
End: 2022-12-13

## 2023-03-08 ENCOUNTER — RESULT REVIEW (OUTPATIENT)
Age: 78
End: 2023-03-08

## 2023-03-09 ENCOUNTER — APPOINTMENT (OUTPATIENT)
Dept: DERMATOLOGY | Facility: CLINIC | Age: 78
End: 2023-03-09
Payer: MEDICARE

## 2023-03-09 PROCEDURE — 11102 TANGNTL BX SKIN SINGLE LES: CPT

## 2023-03-09 PROCEDURE — 99213 OFFICE O/P EST LOW 20 MIN: CPT | Mod: 25

## 2023-03-09 PROCEDURE — 11103 TANGNTL BX SKIN EA SEP/ADDL: CPT | Mod: 59

## 2023-03-30 ENCOUNTER — APPOINTMENT (OUTPATIENT)
Dept: DERMATOLOGY | Facility: CLINIC | Age: 78
End: 2023-03-30
Payer: MEDICARE

## 2023-03-30 DIAGNOSIS — C44.722 SQUAMOUS CELL CARCINOMA OF SKIN OF RIGHT LOWER LIMB, INCLUDING HIP: ICD-10-CM

## 2023-03-30 DIAGNOSIS — L57.0 ACTINIC KERATOSIS: ICD-10-CM

## 2023-03-30 PROCEDURE — 99214 OFFICE O/P EST MOD 30 MIN: CPT

## 2023-03-31 PROBLEM — L57.0 ACTINIC KERATOSES: Status: ACTIVE | Noted: 2023-03-31

## 2023-03-31 PROBLEM — C44.722 SQUAMOUS CELL CARCINOMA OF SKIN OF RIGHT LOWER EXTREMITY: Status: ACTIVE | Noted: 2023-03-30

## 2023-05-09 ENCOUNTER — APPOINTMENT (OUTPATIENT)
Dept: DERMATOLOGY | Facility: CLINIC | Age: 78
End: 2023-05-09
Payer: MEDICARE

## 2023-05-09 PROCEDURE — 99213 OFFICE O/P EST LOW 20 MIN: CPT | Mod: 25

## 2023-05-09 PROCEDURE — 17262 DSTRJ MAL LES T/A/L 1.1-2.0: CPT

## 2023-06-11 ENCOUNTER — RESULT REVIEW (OUTPATIENT)
Age: 78
End: 2023-06-11

## 2023-06-12 ENCOUNTER — APPOINTMENT (OUTPATIENT)
Dept: DERMATOLOGY | Facility: CLINIC | Age: 78
End: 2023-06-12
Payer: MEDICARE

## 2023-06-12 PROCEDURE — 99213 OFFICE O/P EST LOW 20 MIN: CPT | Mod: 25

## 2023-06-12 PROCEDURE — 17262 DSTRJ MAL LES T/A/L 1.1-2.0: CPT | Mod: 59

## 2023-06-12 PROCEDURE — 17263 DSTRJ MAL LES T/A/L 2.1-3.0: CPT

## 2023-09-18 ENCOUNTER — RESULT REVIEW (OUTPATIENT)
Age: 78
End: 2023-09-18

## 2023-09-18 ENCOUNTER — APPOINTMENT (OUTPATIENT)
Dept: DERMATOLOGY | Facility: CLINIC | Age: 78
End: 2023-09-18
Payer: MEDICARE

## 2023-09-18 PROCEDURE — 99214 OFFICE O/P EST MOD 30 MIN: CPT | Mod: 25

## 2023-09-18 PROCEDURE — 11102 TANGNTL BX SKIN SINGLE LES: CPT | Mod: 59

## 2023-09-18 PROCEDURE — 17281 DSTR MAL LS F/E/E/N/L/M .6-1: CPT

## 2023-10-23 ENCOUNTER — RX RENEWAL (OUTPATIENT)
Age: 78
End: 2023-10-23

## 2023-11-09 ENCOUNTER — APPOINTMENT (OUTPATIENT)
Dept: NEUROLOGY | Facility: CLINIC | Age: 78
End: 2023-11-09

## 2023-11-27 ENCOUNTER — APPOINTMENT (OUTPATIENT)
Dept: DERMATOLOGY | Facility: CLINIC | Age: 78
End: 2023-11-27

## 2024-01-03 ENCOUNTER — APPOINTMENT (OUTPATIENT)
Dept: DERMATOLOGY | Facility: CLINIC | Age: 79
End: 2024-01-03
Payer: MEDICARE

## 2024-01-03 ENCOUNTER — RESULT REVIEW (OUTPATIENT)
Age: 79
End: 2024-01-03

## 2024-01-03 PROCEDURE — 11604 EXC TR-EXT MAL+MARG 3.1-4 CM: CPT

## 2024-01-03 PROCEDURE — 99213 OFFICE O/P EST LOW 20 MIN: CPT | Mod: 25

## 2024-01-04 ENCOUNTER — APPOINTMENT (OUTPATIENT)
Dept: DERMATOLOGY | Facility: CLINIC | Age: 79
End: 2024-01-04
Payer: OTHER GOVERNMENT

## 2024-01-04 PROCEDURE — ZZZZZ: CPT

## 2024-01-18 ENCOUNTER — APPOINTMENT (OUTPATIENT)
Dept: DERMATOLOGY | Facility: CLINIC | Age: 79
End: 2024-01-18
Payer: MEDICARE

## 2024-01-18 PROCEDURE — 99212 OFFICE O/P EST SF 10 MIN: CPT

## 2024-01-22 ENCOUNTER — RX RENEWAL (OUTPATIENT)
Age: 79
End: 2024-01-22

## 2024-03-18 ENCOUNTER — APPOINTMENT (OUTPATIENT)
Dept: NEUROLOGY | Facility: CLINIC | Age: 79
End: 2024-03-18
Payer: MEDICARE

## 2024-03-18 VITALS
WEIGHT: 200 LBS | HEIGHT: 61 IN | BODY MASS INDEX: 37.76 KG/M2 | SYSTOLIC BLOOD PRESSURE: 160 MMHG | DIASTOLIC BLOOD PRESSURE: 90 MMHG

## 2024-03-18 DIAGNOSIS — R56.9 UNSPECIFIED CONVULSIONS: ICD-10-CM

## 2024-03-18 DIAGNOSIS — G40.909 EPILEPSY, UNSPECIFIED, NOT INTRACTABLE, W/OUT STATUS EPILEPTICUS: ICD-10-CM

## 2024-03-18 PROCEDURE — 99214 OFFICE O/P EST MOD 30 MIN: CPT

## 2024-03-18 PROCEDURE — G2211 COMPLEX E/M VISIT ADD ON: CPT

## 2024-03-18 NOTE — HISTORY OF PRESENT ILLNESS
[FreeTextEntry1] : I had seen her initially on May 3, 2017 with balance difficulties. She has a long history of epilepsy and was on Dilantin 630 mg a day at that time. She was following with a specialist at [facility] and said she needed to stay on the medicine even know her last seizure was 15 years ago. I sent her for a stat Dilantin level and it was 39. We  reduced the Dilantin 500 mg a day. Level June 28 was 21.4. She did not want to reduce the medicine any further. Level done September 15 was 22.2.   She was still having balance difficulties so eventually we reduced the Dilantin to 400 mg a day. Level a few weeks later was 12.6. She has felt great on Dilantin 400 mg a day. She says her balance is fine. She is functioning much better overall. There have been no further seizures.  Also on medication for blood pressure which is under good control.  Returns today, 3/18/2024. She feels great.  There have been no seizures. Gets her Dilantin level checked through her primary care doctor and she says it has been fine.

## 2024-04-01 ENCOUNTER — RESULT REVIEW (OUTPATIENT)
Age: 79
End: 2024-04-01

## 2024-04-01 ENCOUNTER — APPOINTMENT (OUTPATIENT)
Dept: DERMATOLOGY | Facility: CLINIC | Age: 79
End: 2024-04-01
Payer: MEDICARE

## 2024-04-01 PROCEDURE — 99213 OFFICE O/P EST LOW 20 MIN: CPT | Mod: 25

## 2024-04-01 PROCEDURE — 17262 DSTRJ MAL LES T/A/L 1.1-2.0: CPT

## 2024-06-02 ENCOUNTER — RESULT REVIEW (OUTPATIENT)
Age: 79
End: 2024-06-02

## 2024-06-03 ENCOUNTER — APPOINTMENT (OUTPATIENT)
Dept: DERMATOLOGY | Facility: CLINIC | Age: 79
End: 2024-06-03
Payer: MEDICARE

## 2024-06-03 PROCEDURE — 99213 OFFICE O/P EST LOW 20 MIN: CPT | Mod: 25

## 2024-06-03 PROCEDURE — 17282 DSTR MAL LS F/E/E/N/L/M1.1-2: CPT

## 2024-06-03 PROCEDURE — 17263 DSTRJ MAL LES T/A/L 2.1-3.0: CPT

## 2024-06-04 RX ORDER — PHENYTOIN SODIUM 100 MG/1
100 CAPSULE ORAL
Qty: 360 | Refills: 3 | Status: ACTIVE | COMMUNITY
Start: 2017-06-19 | End: 1900-01-01

## 2024-06-05 ENCOUNTER — APPOINTMENT (OUTPATIENT)
Dept: ORTHOPEDIC SURGERY | Facility: CLINIC | Age: 79
End: 2024-06-05
Payer: MEDICARE

## 2024-06-05 VITALS
DIASTOLIC BLOOD PRESSURE: 80 MMHG | SYSTOLIC BLOOD PRESSURE: 152 MMHG | HEART RATE: 88 BPM | HEIGHT: 61 IN | WEIGHT: 200 LBS | BODY MASS INDEX: 37.76 KG/M2

## 2024-06-05 DIAGNOSIS — M21.00 VALGUS DEFORMITY, NOT ELSEWHERE CLASSIFIED, UNSPECIFIED SITE: ICD-10-CM

## 2024-06-05 DIAGNOSIS — M17.12 UNILATERAL PRIMARY OSTEOARTHRITIS, LEFT KNEE: ICD-10-CM

## 2024-06-05 PROCEDURE — 73562 X-RAY EXAM OF KNEE 3: CPT | Mod: LT

## 2024-06-05 PROCEDURE — 20610 DRAIN/INJ JOINT/BURSA W/O US: CPT | Mod: LT

## 2024-06-05 PROCEDURE — 99204 OFFICE O/P NEW MOD 45 MIN: CPT | Mod: 25

## 2024-06-05 NOTE — PHYSICAL EXAM
[Antalgic] : antalgic [LE] : Sensory: Intact in bilateral lower extremities [ALL] : dorsalis pedis, posterior tibial, femoral, popliteal, and radial 2+ and symmetric bilaterally [Normal] : Alert and in no acute distress [Poor Appearance] : well-appearing [de-identified] : GENERAL APPEARANCE: Well nourished and hydrated, pleasant, alert, and oriented x 3. Appears their stated age.  HEENT: Normocephalic, extraocular eye motion intact. Nasal septum midline. Oral cavity clear. External auditory canal clear.  RESPIRATORY: Breath sounds clear and audible in all lobes. No wheezing, No accessory muscle use. CARDIOVASCULAR: No apparent abnormalities. No lower leg edema. No varicosities. Pedal pulses are palpable. NEUROLOGIC: Sensation is normal, no muscle weakness in the upper or lower extremities. DERMATOLOGIC: No apparent skin lesions, moist, warm, no rash. SPINE: Cervical spine appears normal and moves freely; thoracic spine appears normal and moves freely; lumbosacral spine appears normal and moves freely, normal, nontender. MUSCULOSKELETAL: Hands, wrists, and elbows are normal and move freely, shoulders are normal and move freely.  Musculoskeletal 5/5 motor strength in bilateral lower extremities. Sensory: Intact in bilateral lower extremities. DTRs: Biceps, brachioradialis, triceps, patellar, ankle and plantar 2+ and symmetric bilaterally. Pulses: dorsalis pedis, posterior tibial, femoral, popliteal, and radial 2+ and symmetric bilaterally.  Constitutional: Alert and in no acute distress, but well-appearing.   [de-identified] : Left knee examination shows marked valgus alignment she has range of motion of 5 to 110 degree  [de-identified] : 4V xray of the left knee done in the office today and reviewed by Dr. 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

## 2024-06-05 NOTE — DISCUSSION/SUMMARY
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 The pt is a potential candidate for the left knee. The surgery was discussed in detail. Patient has a medical history of seizure disorder on Dilantin she does have skin condition sees dermatologist every 2-month. This does raise her risk of infection. She will need dermatologist clearance. She is losing ability to bear her body weight with significant valgus deformity which can be corrected with knee replacement. At this time, I suggest exhausting conservative treatment such as cortisone injections, HA injections, PT, anti-inflammatories, and low impact exercise. The pt opted for a left knee coritsone injection which she tolerated well. I gave the pt a referral to Dr. [facility] for osteoporosis evaluation. The pt will f/u in 3 months for re-evaluation.   The patient is a 78 year year old individual with end stage arthritis of their left knee joint. The patient has exhausted a minimum of 3 months conservative treatment including prior injections (cortisone and/or hyaluronic acid injections), physical therapy, over the counter NSAIDS and pain medication where indicated.  In addition, the patient's quality of life is diminished due to significant chronic pain. The patient is having difficulty with activities of daily living, including ambulating, descending stairs, and rising from a seated position. Based upon the patients continued symptoms and failure to respond to conservative treatment, I have recommended a left total knee replacement for this patient. A long discussion took place with the patient describing what a total joint replacement is and what the procedure would entail. A knee model, similar to the implant that will be used during the operation, was utilized to demonstrate and to discuss the various bearing surfaces of the implants. Implant fixation, use of cement, was also discussed with the patient. Choices of implant manufacturers, mainly [employer] and [employer], were discussed and reviewed with preference to be made by patient and surgeon prior to operation. Final selection to be based on customary practice as well as preoperative templating with selection confirmed intraoperatively based on the patient's anatomy. The patient participated and agreed with the decision-making process. The hospitalization and post-operative care and rehabilitation were also discussed. The use of perioperative antibiotics and DVT prophylaxis were discussed. The risk, benefits and alternatives to a surgical intervention were discussed at length with the patient. The patient was also advised of risks related to the medical comorbidities and elevated body mass index (BMI). A lengthy discussion took place to review the most common complications including but not limited to: deep vein thrombosis, pulmonary embolism, heart attack, stroke, infection, wound breakdown, numbness, damage to nerves, tendon, muscles, arteries or other blood vessels, death and other possible complications from anesthesia. The patient was told that we will take steps to minimize these risks by using sterile technique, antibiotics and DVT prophylaxis when appropriate and follow the patient postoperatively in the office setting to monitor progress. The possibility of recurrent pain, no improvement in pain and actual worsening of pain were also discussed with the patient.  The discharge plan of care focused on the patient going home following surgery. The patient was encouraged to make the necessary arrangements to have someone stay with them when they are discharged home. Following discharge, a home care nurse will visit the patient. The home care nurse will open your home care case and request home physical therapy services. Home physical therapy will commence following discharge provided it is appropriate and covered by the health insurance benefit plan.  The benefits of surgery were discussed with the patient including the potential for improving his/her current clinical condition through operative intervention. Alternatives to surgical intervention including continued conservative management were also discussed in detail. All questions were answered to the satisfaction of the patient. The treatment plan of care, as well as a model of a total knee equivalent to the one that will be used for their total joint replacement, was shared with the patient. The patient participated and agreed to the plan of care as well as the use of the recommended implants for their total joint replacement surgery.

## 2024-06-05 NOTE — END OF VISIT
[FreeTextEntry3] : I, Peterson Tom, acted solely as a scribe for Dr. Sal Vernon on 06/05/2024.  I personally performed the services described in the documentation, reviewed the documentation recorded by the scribe in my presence, and it accurately and completely records my words and actions.

## 2024-06-05 NOTE — REVIEW OF SYSTEMS
[Joint Pain] : joint pain [Joint Stiffness] : joint stiffness [Negative] : Heme/Lymph [FreeTextEntry9] : Left knee

## 2024-07-08 ENCOUNTER — RESULT REVIEW (OUTPATIENT)
Age: 79
End: 2024-07-08

## 2024-07-08 ENCOUNTER — APPOINTMENT (OUTPATIENT)
Dept: DERMATOLOGY | Facility: CLINIC | Age: 79
End: 2024-07-08
Payer: MEDICARE

## 2024-07-08 PROCEDURE — 17271 DSTR MAL LES S/N/H/F/G 0.6-1: CPT

## 2024-07-08 PROCEDURE — 11102 TANGNTL BX SKIN SINGLE LES: CPT | Mod: 59

## 2024-07-08 PROCEDURE — 11103 TANGNTL BX SKIN EA SEP/ADDL: CPT | Mod: 59

## 2024-07-08 PROCEDURE — 99213 OFFICE O/P EST LOW 20 MIN: CPT | Mod: 25

## 2024-07-08 PROCEDURE — 17262 DSTRJ MAL LES T/A/L 1.1-2.0: CPT

## 2024-07-17 ENCOUNTER — INPATIENT (INPATIENT)
Facility: HOSPITAL | Age: 79
LOS: 0 days | Discharge: LEFT BEFORE DISCHARGE | DRG: 916 | End: 2024-07-17
Attending: HOSPITALIST | Admitting: INTERNAL MEDICINE
Payer: MEDICARE

## 2024-07-17 VITALS
OXYGEN SATURATION: 100 % | HEART RATE: 83 BPM | TEMPERATURE: 98 F | SYSTOLIC BLOOD PRESSURE: 150 MMHG | DIASTOLIC BLOOD PRESSURE: 77 MMHG | RESPIRATION RATE: 18 BRPM

## 2024-07-17 VITALS
HEART RATE: 99 BPM | WEIGHT: 200.62 LBS | DIASTOLIC BLOOD PRESSURE: 109 MMHG | SYSTOLIC BLOOD PRESSURE: 176 MMHG | RESPIRATION RATE: 20 BRPM | OXYGEN SATURATION: 98 % | TEMPERATURE: 98 F

## 2024-07-17 DIAGNOSIS — T78.3XXA ANGIONEUROTIC EDEMA, INITIAL ENCOUNTER: ICD-10-CM

## 2024-07-17 LAB
ALBUMIN SERPL ELPH-MCNC: 3.8 G/DL — SIGNIFICANT CHANGE UP (ref 3.3–5.2)
ALP SERPL-CCNC: 137 U/L — HIGH (ref 40–120)
ALT FLD-CCNC: 13 U/L — SIGNIFICANT CHANGE UP
ANION GAP SERPL CALC-SCNC: 14 MMOL/L — SIGNIFICANT CHANGE UP (ref 5–17)
APTT BLD: 27.2 SEC — SIGNIFICANT CHANGE UP (ref 24.5–35.6)
AST SERPL-CCNC: 17 U/L — SIGNIFICANT CHANGE UP
BASOPHILS # BLD AUTO: 0.04 K/UL — SIGNIFICANT CHANGE UP (ref 0–0.2)
BASOPHILS NFR BLD AUTO: 0.6 % — SIGNIFICANT CHANGE UP (ref 0–2)
BILIRUB SERPL-MCNC: <0.2 MG/DL — LOW (ref 0.4–2)
BLD GP AB SCN SERPL QL: SIGNIFICANT CHANGE UP
BUN SERPL-MCNC: 39.8 MG/DL — HIGH (ref 8–20)
CALCIUM SERPL-MCNC: 9.2 MG/DL — SIGNIFICANT CHANGE UP (ref 8.4–10.5)
CHLORIDE SERPL-SCNC: 99 MMOL/L — SIGNIFICANT CHANGE UP (ref 96–108)
CO2 SERPL-SCNC: 25 MMOL/L — SIGNIFICANT CHANGE UP (ref 22–29)
CREAT SERPL-MCNC: 1.36 MG/DL — HIGH (ref 0.5–1.3)
EGFR: 40 ML/MIN/1.73M2 — LOW
EOSINOPHIL # BLD AUTO: 0.14 K/UL — SIGNIFICANT CHANGE UP (ref 0–0.5)
EOSINOPHIL NFR BLD AUTO: 2.1 % — SIGNIFICANT CHANGE UP (ref 0–6)
GLUCOSE SERPL-MCNC: 98 MG/DL — SIGNIFICANT CHANGE UP (ref 70–99)
HCT VFR BLD CALC: 29.7 % — LOW (ref 34.5–45)
HGB BLD-MCNC: 9.8 G/DL — LOW (ref 11.5–15.5)
IMM GRANULOCYTES NFR BLD AUTO: 0.3 % — SIGNIFICANT CHANGE UP (ref 0–0.9)
INR BLD: 0.93 RATIO — SIGNIFICANT CHANGE UP (ref 0.85–1.18)
LYMPHOCYTES # BLD AUTO: 1.69 K/UL — SIGNIFICANT CHANGE UP (ref 1–3.3)
LYMPHOCYTES # BLD AUTO: 25.3 % — SIGNIFICANT CHANGE UP (ref 13–44)
MCHC RBC-ENTMCNC: 29.3 PG — SIGNIFICANT CHANGE UP (ref 27–34)
MCHC RBC-ENTMCNC: 33 GM/DL — SIGNIFICANT CHANGE UP (ref 32–36)
MCV RBC AUTO: 88.9 FL — SIGNIFICANT CHANGE UP (ref 80–100)
MONOCYTES # BLD AUTO: 0.52 K/UL — SIGNIFICANT CHANGE UP (ref 0–0.9)
MONOCYTES NFR BLD AUTO: 7.8 % — SIGNIFICANT CHANGE UP (ref 2–14)
NEUTROPHILS # BLD AUTO: 4.27 K/UL — SIGNIFICANT CHANGE UP (ref 1.8–7.4)
NEUTROPHILS NFR BLD AUTO: 63.9 % — SIGNIFICANT CHANGE UP (ref 43–77)
PLATELET # BLD AUTO: 174 K/UL — SIGNIFICANT CHANGE UP (ref 150–400)
POTASSIUM SERPL-MCNC: 3.7 MMOL/L — SIGNIFICANT CHANGE UP (ref 3.5–5.3)
POTASSIUM SERPL-SCNC: 3.7 MMOL/L — SIGNIFICANT CHANGE UP (ref 3.5–5.3)
PROT SERPL-MCNC: 6.9 G/DL — SIGNIFICANT CHANGE UP (ref 6.6–8.7)
PROTHROM AB SERPL-ACNC: 10.3 SEC — SIGNIFICANT CHANGE UP (ref 9.5–13)
RBC # BLD: 3.34 M/UL — LOW (ref 3.8–5.2)
RBC # FLD: 14.6 % — HIGH (ref 10.3–14.5)
SODIUM SERPL-SCNC: 138 MMOL/L — SIGNIFICANT CHANGE UP (ref 135–145)
WBC # BLD: 6.68 K/UL — SIGNIFICANT CHANGE UP (ref 3.8–10.5)
WBC # FLD AUTO: 6.68 K/UL — SIGNIFICANT CHANGE UP (ref 3.8–10.5)

## 2024-07-17 PROCEDURE — 86850 RBC ANTIBODY SCREEN: CPT

## 2024-07-17 PROCEDURE — 85730 THROMBOPLASTIN TIME PARTIAL: CPT

## 2024-07-17 PROCEDURE — 96375 TX/PRO/DX INJ NEW DRUG ADDON: CPT

## 2024-07-17 PROCEDURE — 86900 BLOOD TYPING SEROLOGIC ABO: CPT

## 2024-07-17 PROCEDURE — 99233 SBSQ HOSP IP/OBS HIGH 50: CPT

## 2024-07-17 PROCEDURE — 36415 COLL VENOUS BLD VENIPUNCTURE: CPT

## 2024-07-17 PROCEDURE — 99285 EMERGENCY DEPT VISIT HI MDM: CPT

## 2024-07-17 PROCEDURE — 31575 DIAGNOSTIC LARYNGOSCOPY: CPT

## 2024-07-17 PROCEDURE — 86160 COMPLEMENT ANTIGEN: CPT

## 2024-07-17 PROCEDURE — 85025 COMPLETE CBC W/AUTO DIFF WBC: CPT

## 2024-07-17 PROCEDURE — 70490 CT SOFT TISSUE NECK W/O DYE: CPT | Mod: MC

## 2024-07-17 PROCEDURE — 80053 COMPREHEN METABOLIC PANEL: CPT

## 2024-07-17 PROCEDURE — 96372 THER/PROPH/DIAG INJ SC/IM: CPT | Mod: XU

## 2024-07-17 PROCEDURE — 85610 PROTHROMBIN TIME: CPT

## 2024-07-17 PROCEDURE — 99222 1ST HOSP IP/OBS MODERATE 55: CPT | Mod: 25

## 2024-07-17 PROCEDURE — 96374 THER/PROPH/DIAG INJ IV PUSH: CPT

## 2024-07-17 PROCEDURE — 86901 BLOOD TYPING SEROLOGIC RH(D): CPT

## 2024-07-17 PROCEDURE — 70490 CT SOFT TISSUE NECK W/O DYE: CPT | Mod: 26

## 2024-07-17 RX ORDER — TRANEXAMIC ACID 100 MG/ML
1000 INJECTION, SOLUTION INTRAVENOUS ONCE
Refills: 0 | Status: COMPLETED | OUTPATIENT
Start: 2024-07-17 | End: 2024-07-17

## 2024-07-17 RX ORDER — LEVOTHYROXINE SODIUM 25 MCG
1 TABLET ORAL
Refills: 0 | DISCHARGE

## 2024-07-17 RX ORDER — PHENYTOIN SODIUM 100 MG/1
200 CAPSULE, EXTENDED RELEASE ORAL EVERY 12 HOURS
Refills: 0 | Status: DISCONTINUED | OUTPATIENT
Start: 2024-07-17 | End: 2024-07-17

## 2024-07-17 RX ORDER — CELECOXIB 100 MG/1
1 CAPSULE ORAL
Refills: 0 | DISCHARGE

## 2024-07-17 RX ORDER — PREDNISONE 10 MG/1
1 TABLET ORAL
Qty: 10 | Refills: 0
Start: 2024-07-17

## 2024-07-17 RX ORDER — ERGOCALCIFEROL 1.25 MG/1
2 CAPSULE ORAL
Refills: 0 | DISCHARGE

## 2024-07-17 RX ORDER — HEPARIN SODIUM 50 [USP'U]/ML
5000 INJECTION, SOLUTION INTRAVENOUS EVERY 8 HOURS
Refills: 0 | Status: DISCONTINUED | OUTPATIENT
Start: 2024-07-17 | End: 2024-07-17

## 2024-07-17 RX ORDER — METHYLPREDNISOLONE ACETATE 20 MG/ML
40 VIAL (ML) INJECTION DAILY
Refills: 0 | Status: DISCONTINUED | OUTPATIENT
Start: 2024-07-17 | End: 2024-07-17

## 2024-07-17 RX ORDER — METHYLPREDNISOLONE ACETATE 20 MG/ML
125 VIAL (ML) INJECTION ONCE
Refills: 0 | Status: COMPLETED | OUTPATIENT
Start: 2024-07-17 | End: 2024-07-17

## 2024-07-17 RX ORDER — ENALAPRIL MALEATE AND HYDROCHLOROTHIAZIDE 5; 12.5 MG/1; MG/1
1 TABLET ORAL
Refills: 0 | DISCHARGE

## 2024-07-17 RX ORDER — AMLODIPINE BESYLATE 2.5 MG/1
10 TABLET ORAL DAILY
Refills: 0 | Status: DISCONTINUED | OUTPATIENT
Start: 2024-07-17 | End: 2024-07-17

## 2024-07-17 RX ORDER — LEVOTHYROXINE SODIUM 25 MCG
25 TABLET ORAL DAILY
Refills: 0 | Status: DISCONTINUED | OUTPATIENT
Start: 2024-07-17 | End: 2024-07-17

## 2024-07-17 RX ORDER — PHENYTOIN SODIUM 100 MG/1
3 CAPSULE, EXTENDED RELEASE ORAL
Refills: 0 | DISCHARGE

## 2024-07-17 RX ORDER — AMLODIPINE BESYLATE 2.5 MG/1
1 TABLET ORAL
Qty: 7 | Refills: 0
Start: 2024-07-17 | End: 2024-07-23

## 2024-07-17 RX ORDER — PHENYTOIN SODIUM 100 MG/1
100 CAPSULE, EXTENDED RELEASE ORAL DAILY
Refills: 0 | Status: DISCONTINUED | OUTPATIENT
Start: 2024-07-17 | End: 2024-07-17

## 2024-07-17 RX ORDER — DIPHENHYDRAMINE HCL 12.5MG/5ML
25 ELIXIR ORAL ONCE
Refills: 0 | Status: COMPLETED | OUTPATIENT
Start: 2024-07-17 | End: 2024-07-17

## 2024-07-17 RX ORDER — PHENYTOIN SODIUM 100 MG/1
2 CAPSULE, EXTENDED RELEASE ORAL
Refills: 0 | DISCHARGE

## 2024-07-17 RX ORDER — ACITRETIN 25 MG/1
1 CAPSULE ORAL
Refills: 0 | DISCHARGE

## 2024-07-17 RX ORDER — ATORVASTATIN CALCIUM 20 MG/1
1 TABLET, FILM COATED ORAL
Refills: 0 | DISCHARGE

## 2024-07-17 RX ORDER — FAMOTIDINE 40 MG
20 TABLET ORAL
Refills: 0 | Status: DISCONTINUED | OUTPATIENT
Start: 2024-07-17 | End: 2024-07-17

## 2024-07-17 RX ORDER — LEVOTHYROXINE SODIUM 25 MCG
12.5 TABLET ORAL AT BEDTIME
Refills: 0 | Status: DISCONTINUED | OUTPATIENT
Start: 2024-07-17 | End: 2024-07-17

## 2024-07-17 RX ORDER — DIPHENHYDRAMINE HCL 12.5MG/5ML
25 ELIXIR ORAL EVERY 6 HOURS
Refills: 0 | Status: DISCONTINUED | OUTPATIENT
Start: 2024-07-17 | End: 2024-07-17

## 2024-07-17 RX ORDER — EPINEPHRINE 0.3 MG/.3ML
0.3 INJECTION SUBCUTANEOUS ONCE
Refills: 0 | Status: COMPLETED | OUTPATIENT
Start: 2024-07-17 | End: 2024-07-17

## 2024-07-17 RX ORDER — LEVOTHYROXINE SODIUM 25 MCG
25 TABLET ORAL
Refills: 0 | DISCHARGE

## 2024-07-17 RX ADMIN — Medication 25 MICROGRAM(S): at 12:31

## 2024-07-17 RX ADMIN — Medication 25 MILLIGRAM(S): at 12:33

## 2024-07-17 RX ADMIN — EPINEPHRINE 0.3 MILLIGRAM(S): 0.3 INJECTION SUBCUTANEOUS at 01:58

## 2024-07-17 RX ADMIN — Medication 125 MILLIGRAM(S): at 02:01

## 2024-07-17 RX ADMIN — TRANEXAMIC ACID 220 MILLIGRAM(S): 100 INJECTION, SOLUTION INTRAVENOUS at 02:01

## 2024-07-17 RX ADMIN — PHENYTOIN SODIUM 200 MILLIGRAM(S): 100 CAPSULE, EXTENDED RELEASE ORAL at 12:30

## 2024-07-17 RX ADMIN — Medication 25 MILLIGRAM(S): at 01:59

## 2024-07-17 RX ADMIN — AMLODIPINE BESYLATE 10 MILLIGRAM(S): 2.5 TABLET ORAL at 12:31

## 2024-07-17 NOTE — CHART NOTE - NSCHARTNOTEFT_GEN_A_CORE
The test results and clinical findings were explained to the patient and her family member at the bedside. The plan of care, including the risks, benefits, and alternatives, were discussed with the patient in detail. The patient was informed of the potential complications and risks involved with leaving the hospital prior to completion of treatment. The patient was noted to have capacity to make decisions as well as insight into the current condition and expressed understanding and acceptance of the potential risks. The patient continued to be unwilling to stay in the hospital for further management. The patient was advised to seek immediate medical attention if the decision was made to leave the hospital. She was to avoid further use of enalapril or other medications in that class and to seek immediate evaluation if her symptoms were to reoccur.

## 2024-07-17 NOTE — H&P ADULT - NSHPLABSRESULTS_GEN_ALL_CORE
9.8    6.68  )-----------( 174      ( 17 Jul 2024 01:45 )             29.7     138  |  99  |  39.8<H>  ----------------------------<  98  3.7   |  25.0  |  1.36<H>    Ca    9.2      17 Jul 2024 01:45    TPro  6.9  /  Alb  3.8  /  TBili  <0.2<L>  /  DBili  x   /  AST  17  /  ALT  13  /  AlkPhos  137<H>  07-17    LIVER FUNCTIONS - ( 17 Jul 2024 01:45 )  Alb: 3.8 g/dL / Pro: 6.9 g/dL / ALK PHOS: 137 U/L / ALT: 13 U/L / AST: 17 U/L / GGT: x             PT/INR - ( 17 Jul 2024 01:45 )   PT: 10.3 sec;   INR: 0.93 ratio    PTT - ( 17 Jul 2024 01:45 )  PTT:27.2 sec      Urinalysis Basic - ( 17 Jul 2024 01:45 )  Color: x / Appearance: x / SG: x / pH: x  Gluc: 98 mg/dL / Ketone: x  / Bili: x / Urobili: x   Blood: x / Protein: x / Nitrite: x   Leuk Esterase: x / RBC: x / WBC x   Sq Epi: x / Non Sq Epi: x / Bacteria: x      MICROBIOLOGY/CULTURES-      RADIOLOGY RESULTS-    < from: CT Neck Soft Tissue No Cont (07.17.24 @ 06:30) >    ACC: [id number] EXAM:  CT NECK SOFT TISSUE   ORDERED BY: DOMINGO DUFFY     PROCEDURE DATE:  07/17/2024      INTERPRETATION:  CT neck without contrast    CLINICAL INFORMATION: Allergic reaction    IMPRESSION: Edema within the RIGHT area epiglottic fold and epiglottis   which may be due to inflammation or underlying mass.   Recommend direct   visualization and/or CT neck with IV contrast for complete evaluation.   Punctate calcifications involving the palatine tonsils consistent with   prior inflammation.    --- End of Report ---    KURT DICK MD; Attending Radiologist  This document has been electronically signed. Jul 17 2024  7:34AM    < end of copied text >

## 2024-07-17 NOTE — ED PROVIDER NOTE - OBJECTIVE STATEMENT
78-year-old female history of hypertension, seizure, hypothyroid,  HLD presenting with tongue swelling progressing over the last 4 to 5 hours.  Denies additional symptoms.  No history of angioedema.  No familial history of angioedema.  Is taking enalapril-HCTZ.  Denies rash, nausea, vomiting.  Denies any new exposures including new food/soaps/detergent/clothing.

## 2024-07-17 NOTE — H&P ADULT - NSCORESITESY/N_GEN_A_CORE_RD
59M morbidly obese male w/ PMHx of Hepatic Cirrhosis, ascites, Thrombocytopenia, CHF, HTN, HLD, recent admx for Hepatic Encephalopathy, noncompliant with lactulose who was admitted to OSH  for hepatic encephalopathy, acute blood lose anemia (?variceal bleed, GI ulcer) and possible SBP. He was transferred to [facility] for endoscopy since he is high risk for planned procedure. He is also here for evaluation by Liver Transplant team. No

## 2024-07-17 NOTE — H&P ADULT - ASSESSMENT
78 F PMHx HTN, HLD, epilepsy, hypothyroid, skin cancer, Borges disease, actinic keratosis, arthritis BIBEMS from home for swollen tongue. Patient started to notice tongue swelling/neck swelling last night, did not resolve, started to have trouble talking/breathing prompting her visit to the ED. Denies headache, fever, chills, chest pain/tightness, N/V/D/C, sick contacts. In ED patient found to have angioedema, given steroids, benadryl, pepcid with improvement of symptoms. MICU consulted for angioedema/risk of critical airway.  78 F PMHx HTN, HLD, epilepsy, hypothyroid, skin cancer, Borges disease, actinic keratosis, arthritis BIBEMS from home for swollen tongue. MICU consulted for angioedema/risk of critical airway.     #Angioedema      Neuro: A&Ox3, able to give full history, Hx of epilepsy/restarted on     Cardio: MAP >65, hx of HTN no enalapril-HCTZ which has been held due to angioedema. recommend transitioning to valsartan-HCTZ.     Pulm/ENT: SPO2 >92 on RA, no O2 requirements, angioedema has since resolved post solumedrol/benadryl/pepcid was ordered for 2U FFP but was never given as patient got better before we got to giving it. ENT consulted, no signs of upper airway occlusion, angioedema has since resolved. Patient passed bedside swallow, official swallow eval ordered.     GI: NPO except meds until swallow eval complete.     Endo: Hypothyroid, resume on synthroid     : I/Os, UO       Diet: NPO except meds until official speech/swallow  DVT: 5000 U q8 subq   Dispo: No longer requiring MICU level care, Stepdown on , risk of critical airway,    78 F PMHx HTN, HLD, epilepsy, hypothyroid, skin cancer, Borges disease, actinic keratosis, arthritis BIBEMS from home for swollen tongue. MICU consulted for angioedema/risk of critical airway.     #Angioedema      Neuro: A&Ox3, able to give full history, Hx of epilepsy/restarted on home med: phenytoin.     Cardio: MAP >65, hx of HTN no enalapril-HCTZ which has been held due to angioedema. recommend transitioning to valsartan-HCTZ.     Pulm/ENT: SPO2 >92 on RA, no O2 requirements, angioedema has since resolved post solumedrol/benadryl/pepcid was ordered for 2U FFP but was never given as patient got better before we got to giving it. ENT consulted, no signs of upper airway occlusion, angioedema has since resolved. Patient passed bedside swallow, official swallow eval ordered.     GI: NPO except meds until swallow eval complete.     Endo: Hypothyroid, resume on synthroid     : I/Os, UO       Diet: NPO except meds until official speech/swallow note is in  DVT: 5000 U q8 subq   Dispo: No longer requiring MICU level care, Stepdown on , risk of critical airway,    78 F PMHx HTN, HLD, epilepsy, hypothyroid, skin cancer, Borges disease, actinic keratosis, arthritis BIBEMS from home for swollen tongue with:       1. Angioedema  2. AIDAN      -Patient with significant tongue swelling, with progression into neck. Initially with difficulty phonating/ tolerating secretins; however with no respiratory distress. Etiology unknown, however given history of ACEI, unable to rule out bradykinin mediated. Given Epi, Steroid, Benadryl, Pepcid and TXA in ED. Will give 2U FFP. C1 esterase ordered.   -Patient with appropriate response, tongue swelling subsided. Phonation normal, tolerating secretions. Started on Steroids, Pepcid, Benadryl. CT Soft Tissue Neck ordered, for evaluation of edema. ENT consulted.   -Maintaining O2>93% on NC. Actively titrating FiO2 as tolerated. High risk for rapid decompensation requiring emergent intubation given angioedema. Airway watch x24 hrs.   -Alert and oriented, mentating at baseline. Started on Phenytoin.   -Hemodynamically stable. Defer ACE-I, consider transition to Valsartan-HCTZ. Monitor clinical and laboratory end points of perfusion, maintain MAP>65.   -AIDAN, likely pre-renal v CKD progression. Avoid nephrotoxic medications. Trend labs, replete lytes as needed to maintain K>4, Mg>2 and Phos>3.   -NPO for now. Will need official S/S consult.   -BG Q6hrs while NPO. TSH ordered. Restart Synthroid. Maintain euthyroid.   -H/H stable. Trend H/H, transfuse for hgb <7.0 if necessary. SCD in place.      Admit to ICU. Plan discussed with ICU Attending and ED Attending. Patient and  updated, understanding and agreeable to plan.         CRITICAL CARE TIME SPENT: 109 minutes assessing presenting problems of acute illness, which pose high probability of life threatening deterioration or end organ damage/dysfunction, as well as medical decision making including initiating plan of care, reviewing data, reviewing radiologic exams, discussing with multidisciplinary team,  discussing goals of care with patient/family, and writing this note.  Non-inclusive of procedures performed

## 2024-07-17 NOTE — PROGRESS NOTE ADULT - SUBJECTIVE AND OBJECTIVE BOX
PARMJIT GO  ----------------------------------------  The patient was seen earlier at bedside. Patient with angioedema. Offered no complaints. Reported improvement in tongue swelling. Denied dyspnea dysphagia.    78 F PMHx HTN, HLD, epilepsy, hypothyroid, skin cancer, Borges disease, actinic keratosis, arthritis BIBEMS from home for swollen tongue. Patient started to notice tongue swelling/neck swelling last night, did not resolve, started to have trouble talking/breathing prompting her visit to the ED. Denies headache, fever, chills, chest pain/tightness, N/V/D/C, sick contacts. In ED patient found to have angioedema, given steroids, benadryl, pepcid with improvement of symptoms. MICU consulted for angioedema/risk of critical airway.    Vital Signs Last 24 Hrs  T(C): 36.8 (17 Jul 2024 07:38), Max: 36.8 (17 Jul 2024 01:17)  T(F): 98.3 (17 Jul 2024 07:38), Max: 98.3 (17 Jul 2024 01:17)  HR: 88 (17 Jul 2024 07:38) (88 - 99)  BP: 183/93 (17 Jul 2024 07:38) (176/109 - 183/93)  BP(mean): --  RR: 17 (17 Jul 2024 07:38) (17 - 20)  SpO2: 100% (17 Jul 2024 07:38) (98% - 100%)    Parameters below as of 17 Jul 2024 07:38  Patient On (Oxygen Delivery Method): room air    PHYSICAL EXAMINATION:  ----------------------------------------  General appearance: No acute distress, Awake, Alert  HEENT: Normocephalic, Atraumatic, Conjunctiva clear, EOMI  Neck: Supple, No JVD, No tenderness  Lungs: Breath sound equal bilaterally, No wheezes, No rales  Cardiovascular: S1S2, Regular rhythm  Abdomen: Soft, Nontender, Nondistended, No guarding/rebound, Positive bowel sounds  Extremities: No clubbing, No cyanosis, No edema, No calf tenderness  Neuro: Strength equal bilaterally, No tremors  Psychiatric: Appropriate mood, Normal affect      GENERAL: NAD, lying in bed comfortably   HEAD:  Atraumatic, normocephalic  EYES: EOMI, PERRLA, conjunctiva and sclera clear  ENT: Moist mucous membranes, no glossitis/angioedema on exam, non-enlarged neck, large fat pad on anterior neck.   NECK: Supple, no JVD  HEART: Regular rate and rhythm, no murmurs, rubs, or gallops  LUNGS: Unlabored respirations.  Clear to auscultation bilaterally, no crackles, wheezing, or rhonchi  ABDOMEN: Soft, nontender, nondistended, +BS  EXTREMITIES: 2+ peripheral pulses bilaterally. No clubbing, cyanosis, or edema  NERVOUS SYSTEM:  A&Ox3, no focal deficits   SKIN: Multiple scaling/erythematous lesions, hyperpigmentation on arms/legs/face.    LABORATORY STUDIES:  ----------------------------------------                        9.8    6.68  )-----------( 174      ( 17 Jul 2024 01:45 )             29.7     07-17    138  |  99  |  39.8<H>  ----------------------------<  98  3.7   |  25.0  |  1.36<H>    Ca    9.2      17 Jul 2024 01:45    TPro  6.9  /  Alb  3.8  /  TBili  <0.2<L>  /  DBili  x   /  AST  17  /  ALT  13  /  AlkPhos  137<H>  07-17    LIVER FUNCTIONS - ( 17 Jul 2024 01:45 )  Alb: 3.8 g/dL / Pro: 6.9 g/dL / ALK PHOS: 137 U/L / ALT: 13 U/L / AST: 17 U/L / GGT: x           PT/INR - ( 17 Jul 2024 01:45 )   PT: 10.3 sec;   INR: 0.93 ratio         PTT - ( 17 Jul 2024 01:45 )  PTT:27.2 sec              Urinalysis Basic - ( 17 Jul 2024 01:45 )    Color: x / Appearance: x / SG: x / pH: x  Gluc: 98 mg/dL / Ketone: x  / Bili: x / Urobili: x   Blood: x / Protein: x / Nitrite: x   Leuk Esterase: x / RBC: x / WBC x   Sq Epi: x / Non Sq Epi: x / Bacteria: x          MEDICATIONS  (STANDING):  chlorhexidine 2% Cloths 1 Application(s) Topical <User Schedule>  diphenhydrAMINE Injectable 25 milliGRAM(s) IV Push every 6 hours  famotidine  IVPB 20 milliGRAM(s) IV Intermittent two times a day  heparin   Injectable 5000 Unit(s) SubCutaneous every 8 hours  levothyroxine 25 MICROGram(s) Oral daily  methylPREDNISolone sodium succinate Injectable 40 milliGRAM(s) IV Push daily  phenytoin   Capsule 200 milliGRAM(s) Oral every 12 hours    MEDICATIONS  (PRN):      ASSESSMENT / PLAN:  ----------------------------------------      Dispo:  PARMJIT GO  ----------------------------------------  The patient was seen earlier at bedside. Patient with angioedema. Offered no complaints. Reported improvement in tongue swelling. Denied dyspnea dysphagia.    78 F PMHx HTN, HLD, epilepsy, hypothyroid, skin cancer, Borges disease, actinic keratosis, arthritis BIBEMS from home for swollen tongue. Patient started to notice tongue swelling/neck swelling last night, did not resolve, started to have trouble talking/breathing prompting her visit to the ED. Denies headache, fever, chills, chest pain/tightness, N/V/D/C, sick contacts. In ED patient found to have angioedema, given steroids, benadryl, pepcid with improvement of symptoms. MICU consulted for angioedema/risk of critical airway.    Vital Signs Last 24 Hrs  T(C): 36.8 (17 Jul 2024 07:38), Max: 36.8 (17 Jul 2024 01:17)  T(F): 98.3 (17 Jul 2024 07:38), Max: 98.3 (17 Jul 2024 01:17)  HR: 88 (17 Jul 2024 07:38) (88 - 99)  BP: 183/93 (17 Jul 2024 07:38) (176/109 - 183/93)  BP(mean): --  RR: 17 (17 Jul 2024 07:38) (17 - 20)  SpO2: 100% (17 Jul 2024 07:38) (98% - 100%)    Parameters below as of 17 Jul 2024 07:38  Patient On (Oxygen Delivery Method): room air    PHYSICAL EXAMINATION:  ----------------------------------------  General appearance: No acute distress, Awake, Alert  HEENT: Normocephalic, Atraumatic, Conjunctiva clear, EOMI  Neck: Supple, No JVD, No tenderness  Lungs: Breath sound equal bilaterally, No wheezes, No rales  Cardiovascular: S1S2, Regular rhythm  Abdomen: Soft, Nontender, Nondistended, No guarding/rebound, Positive bowel sounds  Extremities: No clubbing, No cyanosis, No calf tenderness, Mild lower extremity edema  Neuro: Strength equal bilaterally, No tremors  Psychiatric: Appropriate mood, Normal affect    LABORATORY STUDIES:  ----------------------------------------             9.8    6.68  )-----------( 174      ( 17 Jul 2024 01:45 )             29.7     07-17    138  |  99  |  39.8<H>  ----------------------------<  98  3.7   |  25.0  |  1.36<H>    Ca    9.2      17 Jul 2024 01:45    TPro  6.9  /  Alb  3.8  /  TBili  <0.2<L>  /  DBili  x   /  AST  17  /  ALT  13  /  AlkPhos  137<H>  07-17    LIVER FUNCTIONS - ( 17 Jul 2024 01:45 )  Alb: 3.8 g/dL / Pro: 6.9 g/dL / ALK PHOS: 137 U/L / ALT: 13 U/L / AST: 17 U/L / GGT: x           PT/INR - ( 17 Jul 2024 01:45 )   PT: 10.3 sec;   INR: 0.93 ratio    PTT - ( 17 Jul 2024 01:45 )  PTT:27.2 sec    Urinalysis Basic - ( 17 Jul 2024 01:45 )  Color: x / Appearance: x / SG: x / pH: x  Gluc: 98 mg/dL / Ketone: x  / Bili: x / Urobili: x   Blood: x / Protein: x / Nitrite: x   Leuk Esterase: x / RBC: x / WBC x   Sq Epi: x / Non Sq Epi: x / Bacteria: x    MEDICATIONS  (STANDING):  chlorhexidine 2% Cloths 1 Application(s) Topical <User Schedule>  diphenhydrAMINE Injectable 25 milliGRAM(s) IV Push every 6 hours  famotidine  IVPB 20 milliGRAM(s) IV Intermittent two times a day  heparin   Injectable 5000 Unit(s) SubCutaneous every 8 hours  levothyroxine 25 MICROGram(s) Oral daily  methylPREDNISolone sodium succinate Injectable 40 milliGRAM(s) IV Push daily  phenytoin   Capsule 200 milliGRAM(s) Oral every 12 hours      ASSESSMENT / PLAN:  ----------------------------------------  78F with a history of hypertension, hyperlipidemia, epilepsy, skin cancer, actinic keratosis, and arthritis who presented with tongue swelling last night. She was treated for angioedema with epinephrine, methylprednisolone, and diphenhydramine resulting in improvement in the symptoms.     Angioedema  - Suspect secondary to enalapril  - Otolaryngology evaluation noted  - Laryngoscopy was without swelling in the larynx  - No wheezing or stridor on examination  - Speech Pathology evaluation noted with recommendations for a puree consistency diet for now  - On methylprednisolone and diphenhydramine    Hypertension  - Elevated blood pressures  - Close blood pressure monitoring  - For initiation of amlodipine    Hypothyroidism  - On levothyroxine    Epilepsy  - On phenytoin    Discussed with the patient and her family at the bedside        Dispo: Anticipate discharge home in 1-2 days if no recurrence of angioedema

## 2024-07-17 NOTE — SWALLOW BEDSIDE ASSESSMENT ADULT - SLP PERTINENT HISTORY OF CURRENT PROBLEM
HPI: 78-year-old female history of hypertension, seizure, hypothyroid,  HLD presenting with tongue swelling progressing over the last 4 to 5 hours.  Denies additional symptoms.  No history of angioedema.  No familial history of angioedema.  Is taking enalapril-HCTZ.  Denies rash, nausea, vomiting.  Denies any new exposures including new food/soaps/detergent/clothing.. Pt with progressive tongue swelling making it difficult to speak. Was treated with epi, solumedrol, benadryl and pepcid with complete resolution of symptoms per patient. ENT consulted to evaluate pts upper airway for dispo planning. Pt denies allergic episodes such as this with no known allergies.

## 2024-07-17 NOTE — SWALLOW BEDSIDE ASSESSMENT ADULT - ADDITIONAL RECOMMENDATIONS
Pt advised to monitor diet tolerance and to discontinue PO intake should s/s oral swelling return and/or difficulty with swallowing noted. Pt verbalized understanding.

## 2024-07-17 NOTE — PHARMACOTHERAPY INTERVENTION NOTE - COMMENTS
Referenced outpatient fill record and spoke with patient at bedside to confirm medication list. Of note, patient recently filled atorvastatin 10 mg qd in May 2024 for 90 days but says she does not take that.    Outpatient Medication Review updated.    HOME MEDICATIONS:  acitretin 25 mg oral capsule: 1 cap(s) orally once a day (with meals) (17 Jul 2024 09:23)  celecoxib 200 mg oral capsule: 1 cap(s) orally once a day (17 Jul 2024 08:22)  enalapril-hydrochlorothiazide 10 mg-25 mg oral tablet: 1 tab(s) orally once a day (17 Jul 2024 08:23)  levothyroxine 25 mcg (0.025 mg) oral capsule: 1 cap(s) orally once a day (17 Jul 2024 08:25)  phenytoin 100 mg oral capsule, extended release: 2 cap(s) orally every 12 hours (17 Jul 2024 09:17)

## 2024-07-17 NOTE — CONSULT NOTE ADULT - PROBLEM SELECTOR RECOMMENDATION 9
-DC enalapril: alternative for BP meds to be discussed w primary team  -Pt advised to see an allergist  -Dispo per micu/medicine. Pt looks well enough to be downgraded. Would continue  as precaution   -Diet as tolerated

## 2024-07-17 NOTE — ED ADULT NURSE REASSESSMENT NOTE - NS ED NURSE REASSESS COMMENT FT1
Report given to receiving RN Anni in [facility], pt placed on portable cardiac monitor, pt expresses she wants to go home and will AMA if he has to, Dr Talbert made aware, family remains at bedside.

## 2024-07-17 NOTE — ED PROVIDER NOTE - PHYSICAL EXAMINATION
Gen: Well appearing in NAD  Head: large amount of diffuse edema to tongue, still speaking in full sentences and tolerating secretions.  Neck: trachea midline  Resp:  No distress, CTAB  CV: RRR  GI: soft, NTND  Ext: no deformities, no calf ttp, no LE edema  Neuro:  A&O appears non focal  Skin:  Warm and dry as visualized  Psych:  Normal affect and mood

## 2024-07-17 NOTE — ED ADULT NURSE REASSESSMENT NOTE - NS ED NURSE REASSESS COMMENT FT1
MICU to downgrade pt to SDU for critical airway precautions, speech and swallow at bedside for evaluation, do not give plasma as per MICU, awaiting additional orders.

## 2024-07-17 NOTE — CONSULT NOTE ADULT - SUBJECTIVE AND OBJECTIVE BOX
CC: tongue swelling    HPI: 78-year-old female history of hypertension, seizure, hypothyroid,  HLD presenting with tongue swelling progressing over the last 4 to 5 hours.  Denies additional symptoms.  No history of angioedema.  No familial history of angioedema.  Is taking enalapril-HCTZ.  Denies rash, nausea, vomiting.  Denies any new exposures including new food/soaps/detergent/clothing.    Pt with progressive tongue swelling making it difficult to speak. Was treated with epi, solumedrol, benadryl and pepcid with complete resolution of symptoms per patient. ENT consulted to evaluate pts upper airway for dispo planning. Pt denies allergic episodes such as this with no known allergies.       PAST MEDICAL & SURGICAL HISTORY:  HTN (hypertension)      Seizure      No significant past surgical history        Allergies    No Known Allergies    Intolerances      MEDICATIONS  (STANDING):  chlorhexidine 2% Cloths 1 Application(s) Topical <User Schedule>  diphenhydrAMINE Injectable 25 milliGRAM(s) IV Push every 6 hours  famotidine  IVPB 20 milliGRAM(s) IV Intermittent two times a day  heparin   Injectable 5000 Unit(s) SubCutaneous every 8 hours  levothyroxine Injectable 12.5 MICROGram(s) IV Push at bedtime  methylPREDNISolone sodium succinate Injectable 40 milliGRAM(s) IV Push daily  phenytoin  IVPB 100 milliGRAM(s) IV Intermittent daily    MEDICATIONS  (PRN):      Social History: No reported toxic habits    Family history:  No pertinent family history in first degree relatives    ROS:   ENT: all negative except as noted in HPI   Skin: No itching, dryness, rash, changes to hair, or skin masses  CV: denies palpitations  Pulm: denies SOB, cough, hemoptysis  GI: denies change in appetite, indigestion, n/v  : denies pertinent urinary symptoms, urgency  Neuro: denies numbness/tingling, loss of sensation  Psych: denies anxiety  MS: denies muscle weakness, instability  Heme: denies easy bruising or bleeding  Endo: denies heat/cold intolerance, excessive sweating  Vascular: denies LE edema    Vital Signs Last 24 Hrs  T(C): 36.8 (17 Jul 2024 07:38), Max: 36.8 (17 Jul 2024 01:17)  T(F): 98.3 (17 Jul 2024 07:38), Max: 98.3 (17 Jul 2024 01:17)  HR: 88 (17 Jul 2024 07:38) (88 - 99)  BP: 183/93 (17 Jul 2024 07:38) (176/109 - 183/93)  BP(mean): --  RR: 17 (17 Jul 2024 07:38) (17 - 20)  SpO2: 100% (17 Jul 2024 07:38) (98% - 100%)    Parameters below as of 17 Jul 2024 07:38  Patient On (Oxygen Delivery Method): room air                              9.8    6.68  )-----------( 174      ( 17 Jul 2024 01:45 )             29.7    07-17    138  |  99  |  39.8<H>  ----------------------------<  98  3.7   |  25.0  |  1.36<H>    Ca    9.2      17 Jul 2024 01:45    TPro  6.9  /  Alb  3.8  /  TBili  <0.2<L>  /  DBili  x   /  AST  17  /  ALT  13  /  AlkPhos  137<H>  07-17   PT/INR - ( 17 Jul 2024 01:45 )   PT: 10.3 sec;   INR: 0.93 ratio         PTT - ( 17 Jul 2024 01:45 )  PTT:27.2 sec    PHYSICAL EXAM:  Gen: NAD  Skin: No rashes, bruises, or lesions  Head: Normocephalic, Atraumatic  Face: no edema, erythema, or fluctuance. Parotid glands soft without mass  Eyes: no scleral injection  Nose: Nares bilaterally patent, no discharge  Mouth: No Stridor / Drooling / Trismus.  Mucosa moist, tongue/uvula midline, No tongue swelling  Neck: Flat, supple, no lymphadenopathy, trachea midline, no masses  Lymphatic: No lymphadenopathy  Resp: breathing easily, no stridor  CV: no peripheral edema/cyanosis  GI: nondistended   Peripheral vascular: no JVD or edema  Neuro: facial nerve intact, no facial droop        Fiberoptic Indirect laryngoscopy:  (Scope #2 used)  Reason for Laryngoscopy: angioedema    Patient was unable to cooperate with mirror.  Nasopharynx, oropharynx, and hypopharynx clear, no bleeding. Tongue base, posterior pharyngeal wall, vallecula, epiglottis, and subglottis appear normal. No erythema, edema, pooling of secretions, masses or lesions. Airway patent, no foreign body visualized. No glottic/supraglottic edema. True vocal cords, arytenoids, vestibular folds, ventricles, pyriform sinuses, and aryepiglottic folds appear normal bilaterally. Vocal cords mobile with good contact b/l.

## 2024-07-17 NOTE — H&P ADULT - NSHPSOCIALHISTORY_GEN_ALL_CORE
Lives with family, difficult ambulating due to arthritis Lives with family, difficult ambulating due to arthritis. Denies smoking history. Denies ETOH consumption or other illicit drug use.

## 2024-07-17 NOTE — ED ADULT NURSE REASSESSMENT NOTE - NS ED NURSE REASSESS COMMENT FT1
received pt from off going shift. pt a&ox3, denies any pain/discomfort. reports improvement of tongue swelling. resp spontaneous, even and unlabored, lungs clear, skin warm and dry. pending admission. son at bedside. updated on plan of care, verbalize understanding. call bell in reach

## 2024-07-17 NOTE — ED ADULT NURSE REASSESSMENT NOTE - NS ED NURSE REASSESS COMMENT FT1
pt remains a&ox3, denies any pain/discomfort. resp spontaneous, even and unlabored. lungs clear, skin warm and dry. pt states still feels improvement of symptoms. meds as rxd, tolerating pureed diet. pending bed. updated on plan of care, verbalize understanding. call bell in reach

## 2024-07-17 NOTE — ED ADULT NURSE NOTE - OBJECTIVE STATEMENT
Pt presents to ED From home c/o of swollen tongue & difficulty swallowing. Airway intact, rr even and unlabored on room air, NAD. CM- NSR,  in place. Pt safety maintained.

## 2024-07-17 NOTE — SWALLOW BEDSIDE ASSESSMENT ADULT - SWALLOW EVAL: DIAGNOSIS
Oral and pharyngeal stages of swallow appear WFL for purees and thin liquids. Pt without dentures (unable to place 2* edema and not present at bedside) and declining solids >puree at this time.

## 2024-07-17 NOTE — H&P ADULT - NSHPPHYSICALEXAM_GEN_ALL_CORE
Saw patient at 0800 7.17.24    VITALS:   T(C): 36.8 (07-17-24 @ 07:38), Max: 36.8 (07-17-24 @ 01:17)  HR: 88 (07-17-24 @ 07:38) (88 - 99)  BP: 183/93 (07-17-24 @ 07:38) (176/109 - 183/93)  RR: 17 (07-17-24 @ 07:38) (17 - 20)  SpO2: 100% (07-17-24 @ 07:38) (98% - 100%)    GENERAL: NAD, lying in bed comfortably   HEAD:  Atraumatic, normocephalic  EYES: EOMI, PERRLA, conjunctiva and sclera clear  ENT: Moist mucous membranes, no glossitis/angioedema on exam, non-enlarged neck, large fat pad on anterior neck.   NECK: Supple, no JVD  HEART: Regular rate and rhythm, no murmurs, rubs, or gallops  LUNGS: Unlabored respirations.  Clear to auscultation bilaterally, no crackles, wheezing, or rhonchi  ABDOMEN: Soft, nontender, nondistended, +BS  EXTREMITIES: 2+ peripheral pulses bilaterally. No clubbing, cyanosis, or edema  NERVOUS SYSTEM:  A&Ox3, no focal deficits   SKIN: Multiple scaling/erythematous lesions, hyperpigmentation on arms/legs/face. GENERAL: Elderly female, well developed. In mild distress.   HEAD:  Atraumatic, normocephalic.  EYES: EOMI, PERRLA, conjunctiva and sclera clear.  ENT: Moist mucous membranes, no glossitis/angioedema on exam. Large fat pad on anterior neck, soft. Tongue with edema, phonation mildly gargled. Tolerating secretions.   NECK: Supple, no JVD.  HEART: Regular rate and rhythm. No murmurs, rubs, or gallops. S1, S2 intact.  LUNGS: Unlabored respirations.  Clear to auscultation bilaterally. No adventitious sounds. No significant sputum production.   ABDOMEN: Soft, nontender, nondistended, normoactive bowel sounds. No masses palpable.   EXTREMITIES: 2+ peripheral pulses bilaterally. No clubbing, cyanosis, or edema.  NERVOUS SYSTEM:  AOx3, answering question appropriately. Nonfocal.   SKIN: Warm and dry. Multiple scaling/erythematous lesions, hyperpigmentation on arms/legs/face.

## 2024-07-17 NOTE — H&P ADULT - NS PANP COMMENT GEN_ALL_CORE FT
78F with hx of HTN, HLD, controlled epilepsy, actinic keratosis, hypothyroidism presented from home yesterday due to tongue swelling with difficulty swallowing and muffled speech. Pt reports no prior similar episodes or family hx of same. Pt denies any recent new exposures. She has been on enalapril-HCTZ for years and tolerated well. She reports she was just recently started on  acitretin for her actinic keratosis and has been taking for the last 2 weeks. She denies any associated new skin rash compared to baseline, hives or pruritis. She denies associated fevers/chills.  She denies any shortness of breath. She has been able to tolerate saliva well. She denies any known allergies. Pt was given epi, benadryl, solumedrol and TXA on presentation with improvement. Pt reported improvement in tongue swelling from presentation when seen but notes it is still swollen compared to baseline. Will admit to MICU for airway watch but if continued improvement can likely downgrade in AM. Will c/w steroids/antihistamine given rapid response to treatment but may still be related to ACEi related etiology. Given reported ongoing swelling, will order for 2U FFP. C1 esterase inhibitor sent. Will obtain CT soft tissues neck as well as ENT eval in AM. NPO for now pending further improvement and swallow eval.  updated at the bedside.

## 2024-07-17 NOTE — SWALLOW BEDSIDE ASSESSMENT ADULT - COMMENTS
Pt s/p ENT evaluation this AM. Per ENT note:  "Nasopharynx, oropharynx, and hypopharynx clear, no bleeding. Tongue base, posterior pharyngeal wall, vallecula, epiglottis, and subglottis appear normal. No erythema, edema, pooling of secretions, masses or lesions. Airway patent, no foreign body visualized. No glottic/supraglottic edema. True vocal cords, arytenoids, vestibular folds, ventricles, pyriform sinuses, and aryepiglottic folds appear normal bilaterally. Vocal cords mobile with good contact b/l"    Pt reports complete resolution of symptoms

## 2024-07-17 NOTE — SWALLOW BEDSIDE ASSESSMENT ADULT - SLP GENERAL OBSERVATIONS
Pt recd A&A on stretcher in ED, 0x4, cooperative and agreeable to evaluation, tolerating room air, reporting full resolution of lingual swelling, pain 0/10 pre/post

## 2024-07-17 NOTE — H&P ADULT - HISTORY OF PRESENT ILLNESS
78 F PMHx HTN, HLD, epilepsy, hypothyroid, skin cancer, Borges disease, actinic keratosis, arthritis BIBEMS from home for swollen tongue. Patient started to notice tongue swelling/neck swelling last night, did not resolve, started to have trouble talking/breathing prompting her visit to the ED. Denies headache, fever, chills, chest pain/tightness, N/V/D/C, sick contacts. In ED patient found to have angioedema, given steroids, benadryl, pepcid with improvement of symptoms. MICU consulted for angioedema/risk of critical airway.  79 y/o Female with PMH of HTN, HLD, Epilepsy, Hypothyroid, Skin Cancer, Borges Disease, Actinic Keratosis, Arthritis presents to [facility] ED BIBA complaining of swollen tongue. Reports swelling started to develop last night, with eventual progression of swelling into the neck. Admits to associated difficulty speaking/ tolerating secretions. Denies recent change in food/ medications/ regimen. Of note, patient currently takes Enalapril-HCTZ for anti-HTN regimen.     On presentation to the ED, labs significant for AIDAN (39.8/1.36).

## 2024-07-17 NOTE — ED PROVIDER NOTE - PROGRESS NOTE DETAILS
Brooklynn MELCHOR: Patient with significant improvement in edema, however still present.  Patient seen by MICU, will admit to ICU for airway watch.

## 2024-07-17 NOTE — CONSULT NOTE ADULT - ASSESSMENT
77 yo F w acute episode of tongue swelling txd w pepcid benadryl solumedrol and epi w subjective and objective resolution. Laryngoscopy without swelling of the larynx.

## 2024-07-17 NOTE — ED PROVIDER NOTE - CLINICAL SUMMARY MEDICAL DECISION MAKING FREE TEXT BOX
patient with likely ACE inhibitor induced angioedema, will trial epinephrine/Solu-Medrol/Benadryl/Txa. MICU consulted for airway watch.

## 2024-07-19 LAB — C1INH FUNCTIONAL/C1INH TOTAL MFR SERPL: 44 MG/DL — HIGH (ref 21–39)

## 2024-07-29 ENCOUNTER — APPOINTMENT (OUTPATIENT)
Dept: ORTHOPEDIC SURGERY | Facility: CLINIC | Age: 79
End: 2024-07-29
Payer: MEDICARE

## 2024-07-29 VITALS — BODY MASS INDEX: 37.76 KG/M2 | HEIGHT: 61 IN | WEIGHT: 200 LBS

## 2024-07-29 DIAGNOSIS — M17.0 BILATERAL PRIMARY OSTEOARTHRITIS OF KNEE: ICD-10-CM

## 2024-07-29 DIAGNOSIS — Z86.79 PERSONAL HISTORY OF OTHER DISEASES OF THE CIRCULATORY SYSTEM: ICD-10-CM

## 2024-07-29 PROCEDURE — 99203 OFFICE O/P NEW LOW 30 MIN: CPT

## 2024-07-29 PROCEDURE — 73562 X-RAY EXAM OF KNEE 3: CPT | Mod: 50

## 2024-07-29 NOTE — IMAGING
[Bilateral] : knee bilaterally [AP] : anteroposterior [Lateral] : lateral [[place]] : skyline [advanced tricompartmental OA with lateral compartment narrowing and valgus alignment] : advanced tricompartmental OA with lateral compartment narrowing and valgus alignment [de-identified] : Left knee: Effusion.  Medial and lateral joint line tenderness.  Lig stable.  ROM 3-95.  NVI.  Antalgic gait.  Distal redness and mult small open sores.  Right knee: Effusion.  Medial and lateral joint line tenderness.  Lig stable.  ROM 3-95.  NVI.  Antalgic gait.  Distal redness and mult small open sores.

## 2024-07-29 NOTE — DISCUSSION/SUMMARY
[de-identified] : The natural progression of Osteoarthritis was explained to the patient.  We discussed the possible treatment options from conservative to operative.  These included NSAIDS, Glucosamine and Chondrotin sulfate, and Physical Therapy as well different types of injections.  We also discussed that at some point they may progress to needed a TKA.  Information and pamphlets were given when appropriate.

## 2024-07-29 NOTE — ASSESSMENT
[FreeTextEntry1] : 7/29/24: Severe OA both knees.  Has very poor skin quality with multiple open sores distally.  I am very concerned about her ability to heal postop and she would be very high risk for infection which could potentially lead to amputation.  She is going to seek other opinions from dermatologists and rheum to confirm no inflammatory cause for her skin changes.

## 2024-07-29 NOTE — HISTORY OF PRESENT ILLNESS
[9] : 9 [0] : 0 [Sharp] : sharp [Injection therapy] : injection therapy [Nothing helps with pain getting better] : Nothing helps with pain getting better [Walking] : walking [Exercising] : exercising [Stairs] : stairs [de-identified] : 7/29/24: Left > right knee pain for several years, has been treated in the past with NSAIDs and injections.  Last cortisone inj 1 month ago short term relief.  Pain affecting ADLs.  Was told by other surgeons she is not candidate for TKA due to poor skin quality.  Seen by derm and was told skin is sun damaged and not much else can be done. [] : no [FreeTextEntry1] : BL knees, L>R  [FreeTextEntry3] : ~1-2 years  [de-identified] : - [facility] 6/5/24

## 2024-09-04 ENCOUNTER — APPOINTMENT (OUTPATIENT)
Dept: ORTHOPEDIC SURGERY | Facility: CLINIC | Age: 79
End: 2024-09-04
Payer: OTHER GOVERNMENT

## 2024-09-04 VITALS
WEIGHT: 200 LBS | BODY MASS INDEX: 37.76 KG/M2 | DIASTOLIC BLOOD PRESSURE: 89 MMHG | HEART RATE: 86 BPM | HEIGHT: 61 IN | SYSTOLIC BLOOD PRESSURE: 162 MMHG

## 2024-09-04 DIAGNOSIS — M17.0 BILATERAL PRIMARY OSTEOARTHRITIS OF KNEE: ICD-10-CM

## 2024-09-04 DIAGNOSIS — M17.11 UNILATERAL PRIMARY OSTEOARTHRITIS, RIGHT KNEE: ICD-10-CM

## 2024-09-04 DIAGNOSIS — M21.00 VALGUS DEFORMITY, NOT ELSEWHERE CLASSIFIED, UNSPECIFIED SITE: ICD-10-CM

## 2024-09-04 PROCEDURE — 20610 DRAIN/INJ JOINT/BURSA W/O US: CPT | Mod: 50

## 2024-09-04 PROCEDURE — 99214 OFFICE O/P EST MOD 30 MIN: CPT | Mod: 25

## 2024-09-04 PROCEDURE — 99204 OFFICE O/P NEW MOD 45 MIN: CPT | Mod: 25

## 2024-09-04 NOTE — PHYSICAL EXAM
[Antalgic] : antalgic [LE] : Sensory: Intact in bilateral lower extremities [ALL] : dorsalis pedis, posterior tibial, femoral, popliteal, and radial 2+ and symmetric bilaterally [Normal] : Alert and in no acute distress [Poor Appearance] : well-appearing [de-identified] : GENERAL APPEARANCE: Well nourished and hydrated, pleasant, alert, and oriented x 3. Appears their stated age.  HEENT: Normocephalic, extraocular eye motion intact. Nasal septum midline. Oral cavity clear. External auditory canal clear.  RESPIRATORY: Breath sounds clear and audible in all lobes. No wheezing, No accessory muscle use. CARDIOVASCULAR: No apparent abnormalities. No lower leg edema. No varicosities. Pedal pulses are palpable. NEUROLOGIC: Sensation is normal, no muscle weakness in the upper or lower extremities. DERMATOLOGIC: No apparent skin lesions, moist, warm, no rash. SPINE: Cervical spine appears normal and moves freely; thoracic spine appears normal and moves freely; lumbosacral spine appears normal and moves freely, normal, nontender. MUSCULOSKELETAL: Hands, wrists, and elbows are normal and move freely, shoulders are normal and move freely.  Musculoskeletal 5/5 motor strength in bilateral lower extremities. Sensory: Intact in bilateral lower extremities. DTRs: Biceps, brachioradialis, triceps, patellar, ankle and plantar 2+ and symmetric bilaterally. Pulses: dorsalis pedis, posterior tibial, femoral, popliteal, and radial 2+ and symmetric bilaterally.  Constitutional: Alert and in no acute distress, but well-appearing.   [de-identified] : B/L knee examination shows marked valgus alignment she has range of motion of 5 to 110 degree  [de-identified] : We did review previous x-rays taken of both knees at an outside facility on 7/29/2024.  There is evidence of advanced patellofemoral and lateral compartment osteoarthritis of both knees, mild to moderate in the medial compartment of both knees.  No fracture or dislocation was appreciated in these films.  AP, lateral and sunrise views of each knee were reviewed.  Films taken at [facility] and [facility].

## 2024-09-04 NOTE — REVIEW OF SYSTEMS
[Joint Pain] : joint pain [Joint Stiffness] : joint stiffness [Negative] : Heme/Lymph [FreeTextEntry9] : B/L  knee

## 2024-09-04 NOTE — END OF VISIT
[FreeTextEntry3] : I, Keyla Us, acted solely as a scribe for Dr. Sal Vernon on this date 09/04/2024. I personally performed the services described in the documentation, reviewed the documentation recorded by the scribe in my presence, and it accurately and completely records my words and actions.   I, Sal Vrenon MD, personally performed the services described in the documentation, reviewed the documentation recorded by the scribe in my presence and it accurately and completely records my words and actions.

## 2024-09-04 NOTE — DISCUSSION/SUMMARY
[Medication Risks Reviewed] : Medication risks reviewed [Surgical risks reviewed] : Surgical risks reviewed [de-identified] : This is a 78-year-old female with end-stage medial and lateral compartment bone-on-bone osteoarthritis with valgus deformity of the left knee. The nature of her condition and treatment options were discussed in detail. The pt is a potential candidate for left TKA. The surgery was discussed in detail. However, patient has a medical history of seizure disorder on Dilantin she does have skin condition sees dermatologist every 2-month. This does raise her risk of infection. She will need dermatologist clearance. She is losing ability to bear her body weight with significant valgus deformity which can be corrected with knee replacement. At this time, I suggest exhausting conservative treatment such as cortisone injections, HA injections, PT, anti-inflammatories, and low impact exercise. The pt opted for bilateral knee cortisone injections which she tolerated well. The pt will f/u in 3 months for re-evaluation.   The patient is a 78 year old individual with end stage arthritis of their left knee joint. The patient has exhausted a minimum of 3 months conservative treatment including prior injections (cortisone and/or hyaluronic acid injections), physical therapy, over the counter NSAIDS and pain medication where indicated.  In addition, the patient's quality of life is diminished due to significant chronic pain. The patient is having difficulty with activities of daily living, including ambulating, descending stairs, and rising from a seated position. Based upon the patients continued symptoms and failure to respond to conservative treatment, I have recommended a left total knee replacement for this patient. A long discussion took place with the patient describing what a total joint replacement is and what the procedure would entail. A knee model, similar to the implant that will be used during the operation, was utilized to demonstrate and to discuss the various bearing surfaces of the implants. Implant fixation, use of cement, was also discussed with the patient. Choices of implant manufacturers, mainly [employer] and [employer], were discussed and reviewed with preference to be made by patient and surgeon prior to operation. Final selection to be based on customary practice as well as preoperative templating with selection confirmed intraoperatively based on the patient's anatomy. The patient participated and agreed with the decision-making process. The hospitalization and post-operative care and rehabilitation were also discussed. The use of perioperative antibiotics and DVT prophylaxis were discussed. The risk, benefits and alternatives to a surgical intervention were discussed at length with the patient. The patient was also advised of risks related to the medical comorbidities and elevated body mass index (BMI). A lengthy discussion took place to review the most common complications including but not limited to: deep vein thrombosis, pulmonary embolism, heart attack, stroke, infection, wound breakdown, numbness, damage to nerves, tendon, muscles, arteries or other blood vessels, death and other possible complications from anesthesia. The patient was told that we will take steps to minimize these risks by using sterile technique, antibiotics and DVT prophylaxis when appropriate and follow the patient postoperatively in the office setting to monitor progress. The possibility of recurrent pain, no improvement in pain and actual worsening of pain were also discussed with the patient.  The discharge plan of care focused on the patient going home following surgery. The patient was encouraged to make the necessary arrangements to have someone stay with them when they are discharged home. Following discharge, a home care nurse will visit the patient. The home care nurse will open your home care case and request home physical therapy services. Home physical therapy will commence following discharge provided it is appropriate and covered by the health insurance benefit plan.  The benefits of surgery were discussed with the patient including the potential for improving his/her current clinical condition through operative intervention. Alternatives to surgical intervention including continued conservative management were also discussed in detail. All questions were answered to the satisfaction of the patient. The treatment plan of care, as well as a model of a total knee equivalent to the one that will be used for their total joint replacement, was shared with the patient. The patient participated and agreed to the plan of care as well as the use of the recommended implants for their total joint replacement surgery.

## 2024-09-04 NOTE — PROCEDURE
[de-identified] : I injected the patient's right knee today with cortisone for primary osteoarthritis.  I discussed at length with the patient the planned steroid and lidocaine injection. The risks, benefits, convalescence and alternatives were reviewed. The possible side effects discussed included but were not limited to: pain, swelling, heat, bleeding, and redness. Symptoms are generally mild but if they are extensive then contact the office. Giving pain relievers by mouth such as NSAIDs or Tylenol can generally treat the reactions to steroid and lidocaine. Rare cases of infection have been noted. Rash, hives and itching may occur post injection. If you have muscle pain or cramps, flushing and or swelling of the face, rapid heart beat, nausea, dizziness, fever, chills, headache, difficulty breathing, swelling in the arms or legs, or have a prickly feeling of your skin, contact a health care provider immediately. Following this discussion, the knee was prepped with Alcohol and under sterile condition the 80 mg Depo-Medrol and 6 cc Lidocaine injection was performed with a 20 gauge needle through a superolateral injection site. The needle was introduced into the joint, aspiration was performed to ensure intra-articular placement and the medication was injected. Upon withdrawal of the needle the site was cleaned with alcohol and a band aid applied. The patient tolerated the injection well and there were no adverse effects. Post injection instructions included no strenuous activity for 24 hours, cryotherapy and if there are any adverse effects to contact the office.    I injected the patient's left knee today with cortisone for primary osteoarthritis.  I discussed at length with the patient the planned steroid and lidocaine injection. The risks, benefits, convalescence and alternatives were reviewed. The possible side effects discussed included but were not limited to: pain, swelling, heat, bleeding, and redness. Symptoms are generally mild but if they are extensive then contact the office. Giving pain relievers by mouth such as NSAIDs or Tylenol can generally treat the reactions to steroid and lidocaine. Rare cases of infection have been noted. Rash, hives and itching may occur post injection. If you have muscle pain or cramps, flushing and or swelling of the face, rapid heart beat, nausea, dizziness, fever, chills, headache, difficulty breathing, swelling in the arms or legs, or have a prickly feeling of your skin, contact a health care provider immediately. Following this discussion, the knee was prepped with Alcohol and under sterile condition the 80 mg Depo-Medrol and 6 cc Lidocaine injection was performed with a 20 gauge needle through a superolateral injection site. The needle was introduced into the joint, aspiration was performed to ensure intra-articular placement and the medication was injected. Upon withdrawal of the needle the site was cleaned with alcohol and a band aid applied. The patient tolerated the injection well and there were no adverse effects. Post injection instructions included no strenuous activity for 24 hours, cryotherapy and if there are any adverse effects to contact the office.

## 2024-09-04 NOTE — HISTORY OF PRESENT ILLNESS
[Pain Location] : pain [Worsening] : worsening [5] : a current pain level of 5/10 [Rest] : relieved by rest [de-identified] : Alia is a 78-year-old female does present today for follow-up evaluation of left knee osteoarthritis as well as initial evaluation of a new complaint of right knee pain. With regard to the left knee, she reports moderate treatment of pain following a cortisone injection given her last office visit on 6/5/2024.  Previous cortisone injections and viscosupplementation provided by another physician in the past failed to provide her with any relief. With regard to the right knee she reports a chronic history of intermittent, moderate, diffuse dull aching pain.  Exacerbated with walking for long periods of standing.  She reports mild stiffness.  No catching, locking or buckling. Over the past few months she has been seen by several orthopedic surgeons, Dr. Spears and another surgeon earlier this morning from the [facility] from special surgery.  He to these people did tell show that she would be a candidate for total knee arthroplasty given the severity of osteoarthritis, but they would recommend holding off any surgical intervention given her underlying skin condition.  She is currently oriented a second opinion from a specialty dermatologist at [facility]. [Walking] : not worsened by walking

## 2024-09-05 ENCOUNTER — APPOINTMENT (OUTPATIENT)
Dept: DERMATOLOGY | Facility: CLINIC | Age: 79
End: 2024-09-05

## 2024-09-05 PROCEDURE — 99214 OFFICE O/P EST MOD 30 MIN: CPT | Mod: 25

## 2024-09-05 PROCEDURE — 11102 TANGNTL BX SKIN SINGLE LES: CPT

## 2024-09-05 PROCEDURE — 17000 DESTRUCT PREMALG LESION: CPT | Mod: 59

## 2024-09-05 PROCEDURE — 11103 TANGNTL BX SKIN EA SEP/ADDL: CPT | Mod: 59

## 2024-09-05 PROCEDURE — 17003 DESTRUCT PREMALG LES 2-14: CPT | Mod: 59

## 2024-10-08 ENCOUNTER — APPOINTMENT (OUTPATIENT)
Dept: DERMATOLOGY | Facility: CLINIC | Age: 79
End: 2024-10-08

## 2024-10-28 ENCOUNTER — RESULT REVIEW (OUTPATIENT)
Age: 79
End: 2024-10-28

## 2024-10-28 ENCOUNTER — APPOINTMENT (OUTPATIENT)
Dept: DERMATOLOGY | Facility: CLINIC | Age: 79
End: 2024-10-28
Payer: MEDICARE

## 2024-10-28 PROCEDURE — 11103 TANGNTL BX SKIN EA SEP/ADDL: CPT | Mod: 59

## 2024-10-28 PROCEDURE — 11102 TANGNTL BX SKIN SINGLE LES: CPT

## 2024-10-28 PROCEDURE — 99213 OFFICE O/P EST LOW 20 MIN: CPT | Mod: 25

## 2024-12-18 ENCOUNTER — APPOINTMENT (OUTPATIENT)
Dept: ORTHOPEDIC SURGERY | Facility: CLINIC | Age: 79
End: 2024-12-18

## 2025-02-20 ENCOUNTER — APPOINTMENT (OUTPATIENT)
Dept: DERMATOLOGY | Facility: CLINIC | Age: 80
End: 2025-02-20

## 2025-03-04 ENCOUNTER — APPOINTMENT (OUTPATIENT)
Dept: DERMATOLOGY | Facility: CLINIC | Age: 80
End: 2025-03-04
Payer: MEDICARE

## 2025-03-04 PROCEDURE — 99213 OFFICE O/P EST LOW 20 MIN: CPT | Mod: 25

## 2025-03-04 PROCEDURE — 11102 TANGNTL BX SKIN SINGLE LES: CPT

## 2025-03-04 PROCEDURE — 11103 TANGNTL BX SKIN EA SEP/ADDL: CPT | Mod: 59

## 2025-03-18 ENCOUNTER — APPOINTMENT (OUTPATIENT)
Dept: NEUROLOGY | Facility: CLINIC | Age: 80
End: 2025-03-18
Payer: MEDICARE

## 2025-03-18 VITALS
SYSTOLIC BLOOD PRESSURE: 140 MMHG | BODY MASS INDEX: 39.27 KG/M2 | HEIGHT: 60 IN | WEIGHT: 200 LBS | DIASTOLIC BLOOD PRESSURE: 67 MMHG

## 2025-03-18 DIAGNOSIS — R56.9 UNSPECIFIED CONVULSIONS: ICD-10-CM

## 2025-03-18 PROCEDURE — G2211 COMPLEX E/M VISIT ADD ON: CPT

## 2025-03-18 PROCEDURE — 99214 OFFICE O/P EST MOD 30 MIN: CPT

## 2025-03-20 ENCOUNTER — APPOINTMENT (OUTPATIENT)
Dept: DERMATOLOGY | Facility: CLINIC | Age: 80
End: 2025-03-20
Payer: MEDICARE

## 2025-03-20 ENCOUNTER — RESULT REVIEW (OUTPATIENT)
Age: 80
End: 2025-03-20

## 2025-03-20 PROCEDURE — 17272 DSTR MAL LES S/N/H/F/G 1.1-2: CPT

## 2025-03-20 PROCEDURE — 17273 DSTR MAL LES S/N/H/F/G 2.1-3: CPT

## 2025-03-20 PROCEDURE — 99213 OFFICE O/P EST LOW 20 MIN: CPT | Mod: 25

## 2025-03-21 ENCOUNTER — APPOINTMENT (OUTPATIENT)
Dept: DERMATOLOGY | Facility: CLINIC | Age: 80
End: 2025-03-21
Payer: MEDICARE

## 2025-03-21 PROCEDURE — ZZZZZ: CPT

## 2025-03-24 ENCOUNTER — OUTPATIENT (OUTPATIENT)
Dept: OUTPATIENT SERVICES | Facility: HOSPITAL | Age: 80
LOS: 1 days | Discharge: HOME | End: 2025-03-24

## 2025-03-24 DIAGNOSIS — C44.329 SQUAMOUS CELL CARCINOMA OF SKIN OF OTHER PARTS OF FACE: ICD-10-CM

## 2025-03-25 ENCOUNTER — APPOINTMENT (OUTPATIENT)
Dept: DERMATOLOGY | Facility: CLINIC | Age: 80
End: 2025-03-25
Payer: MEDICARE

## 2025-03-25 ENCOUNTER — RESULT REVIEW (OUTPATIENT)
Age: 80
End: 2025-03-25

## 2025-03-25 PROCEDURE — 99213 OFFICE O/P EST LOW 20 MIN: CPT | Mod: 24,25

## 2025-03-25 PROCEDURE — 11103 TANGNTL BX SKIN EA SEP/ADDL: CPT | Mod: 79,59

## 2025-03-25 PROCEDURE — 11102 TANGNTL BX SKIN SINGLE LES: CPT | Mod: 79

## 2025-03-28 ENCOUNTER — RESULT REVIEW (OUTPATIENT)
Age: 80
End: 2025-03-28

## 2025-03-28 ENCOUNTER — APPOINTMENT (OUTPATIENT)
Dept: DERMATOLOGY | Facility: CLINIC | Age: 80
End: 2025-03-28
Payer: MEDICARE

## 2025-03-28 PROCEDURE — 99213 OFFICE O/P EST LOW 20 MIN: CPT | Mod: 24,25

## 2025-03-28 PROCEDURE — 17273 DSTR MAL LES S/N/H/F/G 2.1-3: CPT | Mod: 79

## 2025-04-28 ENCOUNTER — APPOINTMENT (OUTPATIENT)
Dept: HEMATOLOGY ONCOLOGY | Facility: CLINIC | Age: 80
End: 2025-04-28
Payer: MEDICARE

## 2025-04-28 ENCOUNTER — RESULT REVIEW (OUTPATIENT)
Age: 80
End: 2025-04-28

## 2025-04-28 VITALS
OXYGEN SATURATION: 96 % | TEMPERATURE: 97.5 F | WEIGHT: 191.05 LBS | BODY MASS INDEX: 37.51 KG/M2 | SYSTOLIC BLOOD PRESSURE: 148 MMHG | DIASTOLIC BLOOD PRESSURE: 85 MMHG | HEART RATE: 86 BPM | HEIGHT: 60 IN

## 2025-04-28 DIAGNOSIS — C44.42 SQUAMOUS CELL CARCINOMA OF SKIN OF SCALP AND NECK: ICD-10-CM

## 2025-04-28 DIAGNOSIS — C44.90 UNSPECIFIED MALIGNANT NEOPLASM OF SKIN, UNSPECIFIED: ICD-10-CM

## 2025-04-28 LAB
BASOPHILS # BLD AUTO: 0.04 K/UL — SIGNIFICANT CHANGE UP (ref 0–0.2)
BASOPHILS NFR BLD AUTO: 0.5 % — SIGNIFICANT CHANGE UP (ref 0–2)
EOSINOPHIL # BLD AUTO: 0.07 K/UL — SIGNIFICANT CHANGE UP (ref 0–0.5)
EOSINOPHIL NFR BLD AUTO: 0.9 % — SIGNIFICANT CHANGE UP (ref 0–6)
HCT VFR BLD CALC: 38.1 % — SIGNIFICANT CHANGE UP (ref 34.5–45)
HGB BLD-MCNC: 12.4 G/DL — SIGNIFICANT CHANGE UP (ref 11.5–15.5)
IMM GRANULOCYTES # BLD AUTO: 0.04 K/UL — SIGNIFICANT CHANGE UP (ref 0–0.07)
IMM GRANULOCYTES NFR BLD AUTO: 0.5 % — SIGNIFICANT CHANGE UP (ref 0–0.9)
LYMPHOCYTES # BLD AUTO: 1.49 K/UL — SIGNIFICANT CHANGE UP (ref 1–3.3)
LYMPHOCYTES NFR BLD AUTO: 18.4 % — SIGNIFICANT CHANGE UP (ref 13–44)
MCHC RBC-ENTMCNC: 29.2 PG — SIGNIFICANT CHANGE UP (ref 27–34)
MCHC RBC-ENTMCNC: 32.5 G/DL — SIGNIFICANT CHANGE UP (ref 32–36)
MCV RBC AUTO: 89.9 FL — SIGNIFICANT CHANGE UP (ref 80–100)
MONOCYTES # BLD AUTO: 0.49 K/UL — SIGNIFICANT CHANGE UP (ref 0–0.9)
MONOCYTES NFR BLD AUTO: 6 % — SIGNIFICANT CHANGE UP (ref 2–14)
NEUTROPHILS # BLD AUTO: 5.98 K/UL — SIGNIFICANT CHANGE UP (ref 1.8–7.4)
NEUTROPHILS NFR BLD AUTO: 73.7 % — SIGNIFICANT CHANGE UP (ref 43–77)
NRBC # BLD AUTO: 0 K/UL — SIGNIFICANT CHANGE UP (ref 0–0)
NRBC # FLD: 0 K/UL — SIGNIFICANT CHANGE UP (ref 0–0)
NRBC BLD AUTO-RTO: 0 /100 WBCS — SIGNIFICANT CHANGE UP (ref 0–0)
PLATELET # BLD AUTO: 205 K/UL — SIGNIFICANT CHANGE UP (ref 150–400)
PMV BLD: 9.7 FL — SIGNIFICANT CHANGE UP (ref 7–13)
RBC # BLD: 4.24 M/UL — SIGNIFICANT CHANGE UP (ref 3.8–5.2)
RBC # FLD: 14.6 % — HIGH (ref 10.3–14.5)
WBC # BLD: 8.11 K/UL — SIGNIFICANT CHANGE UP (ref 3.8–10.5)
WBC # FLD AUTO: 8.11 K/UL — SIGNIFICANT CHANGE UP (ref 3.8–10.5)

## 2025-04-28 PROCEDURE — G2212 PROLONG OUTPT/OFFICE VIS: CPT

## 2025-04-28 PROCEDURE — 99205 OFFICE O/P NEW HI 60 MIN: CPT

## 2025-04-28 PROCEDURE — G2211 COMPLEX E/M VISIT ADD ON: CPT

## 2025-04-28 RX ORDER — AMLODIPINE BESYLATE 10 MG/1
10 TABLET ORAL
Refills: 0 | Status: ACTIVE | COMMUNITY

## 2025-04-28 RX ORDER — LEVOTHYROXINE SODIUM 25 UG/1
25 CAPSULE ORAL
Refills: 0 | Status: ACTIVE | COMMUNITY

## 2025-04-28 RX ORDER — ACITRETIN 25 MG/1
25 CAPSULE ORAL
Refills: 0 | Status: ACTIVE | COMMUNITY

## 2025-04-28 RX ORDER — HYDROCHLOROTHIAZIDE 25 MG/1
25 TABLET ORAL
Refills: 0 | Status: ACTIVE | COMMUNITY

## 2025-04-28 RX ORDER — ATENOLOL 25 MG/1
25 TABLET ORAL
Refills: 0 | Status: ACTIVE | COMMUNITY

## 2025-04-29 LAB
ALBUMIN SERPL ELPH-MCNC: 4.1 G/DL
ALP BLD-CCNC: 123 U/L
ALT SERPL-CCNC: 14 U/L
ANION GAP SERPL CALC-SCNC: 15 MMOL/L
AST SERPL-CCNC: 19 U/L
BILIRUB SERPL-MCNC: 0.3 MG/DL
BUN SERPL-MCNC: 27 MG/DL
CALCIUM SERPL-MCNC: 9.5 MG/DL
CHLORIDE SERPL-SCNC: 99 MMOL/L
CO2 SERPL-SCNC: 28 MMOL/L
CREAT SERPL-MCNC: 1.11 MG/DL
EGFRCR SERPLBLD CKD-EPI 2021: 51 ML/MIN/1.73M2
FERRITIN SERPL-MCNC: 422 NG/ML
FOLATE SERPL-MCNC: 6.5 NG/ML
GLUCOSE SERPL-MCNC: 118 MG/DL
IRON SATN MFR SERPL: 43 %
IRON SERPL-MCNC: 101 UG/DL
LDH SERPL-CCNC: 233 U/L
POTASSIUM SERPL-SCNC: 3 MMOL/L
PROT SERPL-MCNC: 7.6 G/DL
SODIUM SERPL-SCNC: 142 MMOL/L
TIBC SERPL-MCNC: 233 UG/DL
UIBC SERPL-MCNC: 132 UG/DL
VIT B12 SERPL-MCNC: 403 PG/ML

## 2025-04-30 RX ORDER — POTASSIUM CHLORIDE 1500 MG/1
20 TABLET, EXTENDED RELEASE ORAL DAILY
Qty: 7 | Refills: 0 | Status: ACTIVE | COMMUNITY
Start: 2025-04-30 | End: 1900-01-01

## 2025-05-01 ENCOUNTER — APPOINTMENT (OUTPATIENT)
Dept: SURGICAL ONCOLOGY | Facility: CLINIC | Age: 80
End: 2025-05-01
Payer: MEDICARE

## 2025-05-01 VITALS
OXYGEN SATURATION: 95 % | HEIGHT: 60 IN | BODY MASS INDEX: 37.5 KG/M2 | HEART RATE: 78 BPM | TEMPERATURE: 97.8 F | SYSTOLIC BLOOD PRESSURE: 166 MMHG | DIASTOLIC BLOOD PRESSURE: 92 MMHG | WEIGHT: 191.03 LBS

## 2025-05-01 PROCEDURE — G2211 COMPLEX E/M VISIT ADD ON: CPT

## 2025-05-01 PROCEDURE — 99205 OFFICE O/P NEW HI 60 MIN: CPT

## 2025-05-07 ENCOUNTER — APPOINTMENT (OUTPATIENT)
Dept: RADIATION ONCOLOGY | Facility: CLINIC | Age: 80
End: 2025-05-07
Payer: MEDICARE

## 2025-05-07 ENCOUNTER — NON-APPOINTMENT (OUTPATIENT)
Age: 80
End: 2025-05-07

## 2025-05-07 VITALS
HEIGHT: 60 IN | BODY MASS INDEX: 37.89 KG/M2 | HEART RATE: 83 BPM | SYSTOLIC BLOOD PRESSURE: 153 MMHG | RESPIRATION RATE: 16 BRPM | DIASTOLIC BLOOD PRESSURE: 83 MMHG | OXYGEN SATURATION: 99 % | WEIGHT: 193 LBS

## 2025-05-07 DIAGNOSIS — C44.722 SQUAMOUS CELL CARCINOMA OF SKIN OF RIGHT LOWER LIMB, INCLUDING HIP: ICD-10-CM

## 2025-05-07 PROCEDURE — 99205 OFFICE O/P NEW HI 60 MIN: CPT

## 2025-05-09 ENCOUNTER — APPOINTMENT (OUTPATIENT)
Dept: DERMATOLOGY | Facility: CLINIC | Age: 80
End: 2025-05-09
Payer: MEDICARE

## 2025-05-09 ENCOUNTER — RESULT REVIEW (OUTPATIENT)
Age: 80
End: 2025-05-09

## 2025-05-09 PROCEDURE — 17262 DSTRJ MAL LES T/A/L 1.1-2.0: CPT

## 2025-05-09 PROCEDURE — 99213 OFFICE O/P EST LOW 20 MIN: CPT | Mod: 25

## 2025-05-09 PROCEDURE — 17281 DSTR MAL LS F/E/E/N/L/M .6-1: CPT

## 2025-05-12 ENCOUNTER — APPOINTMENT (OUTPATIENT)
Dept: DERMATOLOGY | Facility: CLINIC | Age: 80
End: 2025-05-12
Payer: MEDICARE

## 2025-05-12 PROCEDURE — 17262 DSTRJ MAL LES T/A/L 1.1-2.0: CPT | Mod: 79

## 2025-05-12 PROCEDURE — 99214 OFFICE O/P EST MOD 30 MIN: CPT | Mod: 24,25

## 2025-05-12 PROCEDURE — 96405 CHEMO INTRALESIONAL UP TO 7: CPT | Mod: 79,59

## 2025-05-13 ENCOUNTER — APPOINTMENT (OUTPATIENT)
Dept: DERMATOLOGY | Facility: CLINIC | Age: 80
End: 2025-05-13

## 2025-05-30 ENCOUNTER — APPOINTMENT (OUTPATIENT)
Dept: HEMATOLOGY ONCOLOGY | Facility: CLINIC | Age: 80
End: 2025-05-30

## 2025-06-23 ENCOUNTER — APPOINTMENT (OUTPATIENT)
Dept: DERMATOLOGY | Facility: CLINIC | Age: 80
End: 2025-06-23
Payer: MEDICARE

## 2025-06-23 ENCOUNTER — RESULT REVIEW (OUTPATIENT)
Age: 80
End: 2025-06-23

## 2025-06-23 PROCEDURE — 17261 DSTRJ MAL LES T/A/L .6-1.0CM: CPT

## 2025-06-23 PROCEDURE — 96405 CHEMO INTRALESIONAL UP TO 7: CPT | Mod: 59

## 2025-06-23 PROCEDURE — 17262 DSTRJ MAL LES T/A/L 1.1-2.0: CPT

## 2025-06-23 PROCEDURE — 99213 OFFICE O/P EST LOW 20 MIN: CPT | Mod: 25

## 2025-08-18 ENCOUNTER — RESULT REVIEW (OUTPATIENT)
Age: 80
End: 2025-08-18

## 2025-08-18 ENCOUNTER — APPOINTMENT (OUTPATIENT)
Dept: DERMATOLOGY | Facility: CLINIC | Age: 80
End: 2025-08-18
Payer: MEDICARE

## 2025-08-18 PROCEDURE — 12032 INTMD RPR S/A/T/EXT 2.6-7.5: CPT | Mod: 59

## 2025-08-18 PROCEDURE — 99213 OFFICE O/P EST LOW 20 MIN: CPT | Mod: 25

## 2025-08-18 PROCEDURE — 17000 DESTRUCT PREMALG LESION: CPT | Mod: 59

## 2025-08-18 PROCEDURE — 11603 EXC TR-EXT MAL+MARG 2.1-3 CM: CPT | Mod: 59

## 2025-08-18 PROCEDURE — 17281 DSTR MAL LS F/E/E/N/L/M .6-1: CPT

## 2025-08-19 ENCOUNTER — APPOINTMENT (OUTPATIENT)
Dept: DERMATOLOGY | Facility: CLINIC | Age: 80
End: 2025-08-19
Payer: MEDICARE

## 2025-08-19 PROCEDURE — 99212 OFFICE O/P EST SF 10 MIN: CPT | Mod: 24

## 2025-08-29 ENCOUNTER — APPOINTMENT (OUTPATIENT)
Dept: DERMATOLOGY | Facility: CLINIC | Age: 80
End: 2025-08-29
Payer: MEDICARE

## 2025-08-29 PROCEDURE — ZZZZZ: CPT
